# Patient Record
Sex: FEMALE | Race: WHITE | NOT HISPANIC OR LATINO | Employment: OTHER | URBAN - METROPOLITAN AREA
[De-identification: names, ages, dates, MRNs, and addresses within clinical notes are randomized per-mention and may not be internally consistent; named-entity substitution may affect disease eponyms.]

---

## 2017-01-16 ENCOUNTER — OFFICE VISIT (OUTPATIENT)
Dept: PRIMARY CARE CLINIC | Facility: CLINIC | Age: 72
End: 2017-01-16
Payer: MEDICARE

## 2017-01-16 VITALS
BODY MASS INDEX: 28.13 KG/M2 | SYSTOLIC BLOOD PRESSURE: 184 MMHG | OXYGEN SATURATION: 95 % | WEIGHT: 143.31 LBS | RESPIRATION RATE: 22 BRPM | DIASTOLIC BLOOD PRESSURE: 74 MMHG | HEIGHT: 60 IN | HEART RATE: 64 BPM | TEMPERATURE: 97 F

## 2017-01-16 DIAGNOSIS — R06.2 WHEEZING: ICD-10-CM

## 2017-01-16 DIAGNOSIS — J44.1 COPD EXACERBATION: Primary | ICD-10-CM

## 2017-01-16 PROCEDURE — 3078F DIAST BP <80 MM HG: CPT | Mod: S$GLB,,, | Performed by: NURSE PRACTITIONER

## 2017-01-16 PROCEDURE — 1126F AMNT PAIN NOTED NONE PRSNT: CPT | Mod: S$GLB,,, | Performed by: NURSE PRACTITIONER

## 2017-01-16 PROCEDURE — 94640 AIRWAY INHALATION TREATMENT: CPT | Mod: S$GLB,,, | Performed by: NURSE PRACTITIONER

## 2017-01-16 PROCEDURE — 1157F ADVNC CARE PLAN IN RCRD: CPT | Mod: S$GLB,,, | Performed by: NURSE PRACTITIONER

## 2017-01-16 PROCEDURE — 99999 PR PBB SHADOW E&M-EST. PATIENT-LVL IV: CPT | Mod: PBBFAC,,, | Performed by: NURSE PRACTITIONER

## 2017-01-16 PROCEDURE — 1160F RVW MEDS BY RX/DR IN RCRD: CPT | Mod: S$GLB,,, | Performed by: NURSE PRACTITIONER

## 2017-01-16 PROCEDURE — 99213 OFFICE O/P EST LOW 20 MIN: CPT | Mod: 25,S$GLB,, | Performed by: NURSE PRACTITIONER

## 2017-01-16 PROCEDURE — 3077F SYST BP >= 140 MM HG: CPT | Mod: S$GLB,,, | Performed by: NURSE PRACTITIONER

## 2017-01-16 PROCEDURE — 1159F MED LIST DOCD IN RCRD: CPT | Mod: S$GLB,,, | Performed by: NURSE PRACTITIONER

## 2017-01-16 RX ORDER — ALBUTEROL SULFATE 0.83 MG/ML
2.5 SOLUTION RESPIRATORY (INHALATION)
Status: COMPLETED | OUTPATIENT
Start: 2017-01-16 | End: 2017-01-16

## 2017-01-16 RX ORDER — ALBUTEROL SULFATE 90 UG/1
2 AEROSOL, METERED RESPIRATORY (INHALATION) EVERY 6 HOURS PRN
Qty: 18 G | Refills: 0 | Status: SHIPPED | OUTPATIENT
Start: 2017-01-16 | End: 2017-03-15 | Stop reason: SDUPTHER

## 2017-01-16 RX ORDER — DOXYCYCLINE 100 MG/1
100 CAPSULE ORAL 2 TIMES DAILY
Qty: 14 CAPSULE | Refills: 0 | Status: SHIPPED | OUTPATIENT
Start: 2017-01-16 | End: 2017-01-23

## 2017-01-16 RX ADMIN — ALBUTEROL SULFATE 2.5 MG: 0.83 SOLUTION RESPIRATORY (INHALATION) at 10:01

## 2017-01-16 NOTE — PATIENT INSTRUCTIONS
"Your symptoms today are consistent with lung infection and wheezing.  This is likely a viral illness that needs to run its course.    Doxycycline antibiotic prescribed for the infection.  Start it today.  If you get stomach upset from antibiotics, try taking probiotic or eating a yogurt with active culture to prevent stomach upset while on the antibiotic.    Comfort measures:  Increase fluids  Get plenty of rest  Vaporizer or frequent showers may be helpful.  Take tylenol of ibuprofen as needed for pain or fever  For cough and thick mucus secretions, you can take over the counter guaifenesin and dextromethorphan (mucinex DM), drink plenty of water while taking this to help loosen mucus.  The following is also prescribed for the cough albuterol inhaler.    If you have high blood pressure, avoid any over the counter medications with "D" or decongestant.  Medication for colds that "HBP" designation (like Coricidin HBP or similar).    Salt water gargles.  Saline nasal spray  Wash cloth with warm water placed over the sinus area can lessen discomfort and pressure.  Sleep with head of bed elevated to decrease drainage, cough and congestion.    If you are using an spacer with the inhaler:  Shake the inhaler.  Connect it to the spacer.  Put the spacer to your lips.  Spray the medication into the spacer.  Slowly inhale the medication from the spacer.  Hold to the count of 10, if you can.  Breathe out.  Rinse your mouth out with water and spit it in the sink.  Wait 1-2 minutes.  Repeat.    I recommend frequent handwashing to limit spread of infection to others     If you are not better in 3-5 days, if worse or you have concerns or questions, please do not hesitate to call.  You can reach us at [phone] Monday through Friday (except holidays) 12 nooon to 6y p.m.  Or you can follow up with your primary care provider/NP.    Thank you for using the [facility]!    ALETA Hand, CNP, FNP-BC  [facility]

## 2017-01-16 NOTE — MR AVS SNAPSHOT
[facility]  [address]  [place] [address]  Phone: [phone]                  Maryann Haines   2017 10:20 AM   Office Visit    Description:  Female : 1945   Provider:  Love Torres NP   Department:  [facility]           Reason for Visit     URI     LRI     Shortness of Breath           Diagnoses this Visit        Comments    COPD exacerbation    -  Primary with infection    Wheezing                To Do List           Future Appointments        Provider Department Dept Phone    2017 8:20 AM Hong Alcala MD [facility] [phone]      Goals (5 Years of Data)     None       These Medications        Disp Refills Start End    doxycycline (MONODOX) 100 MG capsule 14 capsule 0 2017    Take 1 capsule (100 mg total) by mouth 2 (two) times daily. - Oral    Pharmacy: [facility] [address] [place] [address] [id number] [place] [address] AT [facility] [address] Ph #: [phone]       albuterol 90 mcg/actuation inhaler 18 g 0 2017     Inhale 2 puffs into the lungs every 6 (six) hours as needed for Wheezing. - Inhalation    Pharmacy: [facility] [place] [address] AT [facility] [address] Ph #: [phone]         [facility] On Call     [facility] On [facility] Care [facility] -  Assistance  Registered nurses in the [facility] provide clinical advisement, health education, appointment booking, and other advisory services.  Call for this free service at [phone].             Medications           Message regarding Medications     Verify the changes and/or additions to your medication regime listed below are the same as discussed with your clinician today.  If any of these changes or additions are incorrect, please notify your healthcare provider.        START taking these NEW medications        Refills    doxycycline (MONODOX) 100 MG capsule 0    Sig: Take  1 capsule (100 mg total) by mouth 2 (two) times daily.    Class: Normal    Route: Oral    albuterol 90 mcg/actuation inhaler 0    Sig: Inhale 2 puffs into the lungs every 6 (six) hours as needed for Wheezing.    Class: Normal    Route: Inhalation      These medications were administered today        Dose Freq    albuterol nebulizer solution 2.5 mg 2.5 mg [facility] 1 time    Sig: Take 3 mLs (2.5 mg total) by nebulization one time.    Class: Normal    Route: Nebulization           Verify that the below list of medications is an accurate representation of the medications you are currently taking.  If none reported, the list may be blank. If incorrect, please contact your healthcare provider. Carry this list with you in case of emergency.           Current Medications     amlodipine (NORVASC) 5 MG tablet Take 1 tablet (5 mg total) by mouth once daily.    aspirin (ECOTRIN) 81 MG EC tablet Take 81 mg by mouth once daily.    atenolol (TENORMIN) 25 MG tablet Take 1 tablet (25 mg total) by mouth once daily.    clopidogrel (PLAVIX) 75 mg tablet Take 1 tablet (75 mg total) by mouth once daily.    hydrochlorothiazide (HYDRODIURIL) 25 MG tablet Take 1 tablet (25 mg total) by mouth once daily.    lisinopril (PRINIVIL,ZESTRIL) 20 MG tablet Take 1 tablet (20 mg total) by mouth once daily.    lorazepam (ATIVAN) 1 MG tablet Take 1 tablet (1 mg total) by mouth 2 (two) times daily as needed.    metformin (GLUCOPHAGE) 500 MG tablet Take 1 tablet (500 mg total) by mouth 2 (two) times daily with meals.    niacin (SLO-NIACIN) 500 mg TbSR Take 500 mg by mouth nightly.    nystatin (MYCOSTATIN) cream Apply topically 2 (two) times daily.    pravastatin (PRAVACHOL) 40 MG tablet Take 1 tablet (40 mg total) by mouth once daily.    albuterol 90 mcg/actuation inhaler Inhale 2 puffs into the lungs every 6 (six) hours as needed for Wheezing.    doxycycline (MONODOX) 100 MG capsule Take 1 capsule (100 mg total) by mouth 2 (two) times daily.            Clinical Reference Information           Vital Signs - Last Recorded  Most recent update: 1/16/2017  9:52 AM by Viv Das LPN    BP Pulse Temp Resp Ht Wt    (!) 184/74 (BP Location: Left arm, Patient Position: Sitting, BP Method: Manual) 64 97.4 °F (36.3 °C) (Oral) (!) 22 5' (1.524 m) 65 kg (143 lb 4.8 oz)    SpO2 BMI             95% 27.99 kg/m2         Blood Pressure          Most Recent Value    BP  (!)  184/74      Allergies as of 1/16/2017     No Known Allergies      Immunizations Administered on Date of Encounter - 1/16/2017     None      Administrations This Visit     albuterol nebulizer solution 2.5 mg     Admin Date Action Dose Route Administered By             01/16/2017 Given 2.5 mg Nebulization Viv Das LPN                      [employer] Sign-Up     Activating your [facility] account is as easy as 1-2-3!     1) Visit [email or web address], select Sign Up Now, enter this activation code and your date of birth, then select Next.  [email or web address]  Expires: 3/2/2017 10:43 AM      2) Create a username and password to use when you visit [facility] in the future and select a security question in case you lose your password and select Next.    3) Enter your e-mail address and click Sign Up!    Additional Information  If you have questions, please e-mail [email or web address] or call [phone] to talk to our [facility] staff. Remember, [facility] is NOT to be used for urgent needs. For medical emergencies, dial 911.         Instructions    Your symptoms today are consistent with lung infection and wheezing.  This is likely a viral illness that needs to run its course.    Doxycycline antibiotic prescribed for the infection.  Start it today.  If you get stomach upset from antibiotics, try taking probiotic or eating a yogurt with active culture to prevent stomach upset while on the antibiotic.    Comfort measures:  Increase fluids  Get plenty of rest  Vaporizer or frequent showers may be  "helpful.  Take tylenol of ibuprofen as needed for pain or fever  For cough and thick mucus secretions, you can take over the counter guaifenesin and dextromethorphan (mucinex DM), drink plenty of water while taking this to help loosen mucus.  The following is also prescribed for the cough albuterol inhaler.    If you have high blood pressure, avoid any over the counter medications with "D" or decongestant.  Medication for colds that "HBP" designation (like Coricidin HBP or similar).    Salt water gargles.  Saline nasal spray  Wash cloth with warm water placed over the sinus area can lessen discomfort and pressure.  Sleep with head of bed elevated to decrease drainage, cough and congestion.    If you are using an spacer with the inhaler:  Shake the inhaler.  Connect it to the spacer.  Put the spacer to your lips.  Spray the medication into the spacer.  Slowly inhale the medication from the spacer.  Hold to the count of 10, if you can.  Breathe out.  Rinse your mouth out with water and spit it in the sink.  Wait 1-2 minutes.  Repeat.    I recommend frequent handwashing to limit spread of infection to others     If you are not better in 3-5 days, if worse or you have concerns or questions, please do not hesitate to call.  You can reach us at [phone] Monday through Friday (except holidays) 12 nooon to 6y p.m.  Or you can follow up with your primary care provider/NP.    Thank you for using the [facility]!    ALETA Hand, CNP, FNP-BC  [facility]           "

## 2017-01-16 NOTE — Clinical Note
Hong Alcala MD,  I saw your patient today in the [facility].  If you have any questions, please do not hesitate to contact me or Dr. Virk.  Thank you!  SHARATH Hand

## 2017-01-16 NOTE — PROGRESS NOTES
"Maryann Haines is a 71 y.o. female patient of Hong Alcala MD who presents to the clinic today for   Chief Complaint   Patient presents with    URI     nasal congestion, ear pressure, dizzy    LRI     cough, chest congestion    Shortness of Breath   .    HPI    Pt, who is not known to me, reports a new problem to me, as follows:  Head and nasal congestion, ear pressure, no ST, + RN, + wheezing, + feverish.  Grandson had bronchitis and strep and then she became ill.    These symptoms began 4 days ago and status is maybe "a smidge" better.  But then gets worse again..     Symptoms are + acute exac of chronic lung problems (smoker).    Pt denies the following symptoms:  ST, stomach ache, rash    Aggravating factors include nothing .    Relieving factors include nothing .    OTC Medications tried are sinus medicine (helped her ears).    Prescription medications taken for symptoms are nothing.    Pertinent medical history:  Tobacco abuse, usually doesn't get ill..    Smoking status:  Continues to smoke, decreasing amount    ROS    Constitutional:   Feels at times like she has  fever, + fatigue, no change in appetite.      Head:    + headache    EENT:  Ears:    No pain  Eyes:    No sxs  Nose:    No sinus pain, + congestion, + runny nose.  Throat:  no ST pain.               Heart:  No palpitations, chest pain.    Lungs:  + difficulty breathing, + coughing, + sputum production, + wheezing.              Symptoms are community acquired.    GI/:  No sxs    MS:  No new bone, joint or muscle problems.    Skin:  No rashes, itching.    ALLERGIES AND MEDICATIONS: updated and reviewed.  Review of patient's allergies indicates:  No Known Allergies  Current Outpatient Prescriptions   Medication Sig Dispense Refill    amlodipine (NORVASC) 5 MG tablet Take 1 tablet (5 mg total) by mouth once daily. 90 tablet 0    aspirin (ECOTRIN) 81 MG EC tablet Take 81 mg by mouth once daily.      atenolol (TENORMIN) 25 MG tablet Take 1 " tablet (25 mg total) by mouth once daily. 90 tablet 0    clopidogrel (PLAVIX) 75 mg tablet Take 1 tablet (75 mg total) by mouth once daily. 90 tablet 0    hydrochlorothiazide (HYDRODIURIL) 25 MG tablet Take 1 tablet (25 mg total) by mouth once daily. 90 tablet 3    lisinopril (PRINIVIL,ZESTRIL) 20 MG tablet Take 1 tablet (20 mg total) by mouth once daily. 90 tablet 0    lorazepam (ATIVAN) 1 MG tablet Take 1 tablet (1 mg total) by mouth 2 (two) times daily as needed. 60 tablet 2    metformin (GLUCOPHAGE) 500 MG tablet Take 1 tablet (500 mg total) by mouth 2 (two) times daily with meals. 180 tablet 0    niacin (SLO-NIACIN) 500 mg TbSR Take 500 mg by mouth nightly. 90 tablet 2    nystatin (MYCOSTATIN) cream Apply topically 2 (two) times daily. 90 g 1    pravastatin (PRAVACHOL) 40 MG tablet Take 1 tablet (40 mg total) by mouth once daily. 90 tablet 0     No current facility-administered medications for this visit.        PHYSICAL EXAM    Alert, coop 71 y.o. female patient in no acute distress.    Vitals:    01/16/17 0946 01/16/17 0950   BP: (!) 196/91 (!) 184/74   BP Location: Left arm Left arm   Patient Position: Sitting Sitting   BP Method: Automatic Manual   Pulse: 64    Resp: (!) 22    Temp: 97.4 °F (36.3 °C)    TempSrc: Oral    SpO2: 95%    Weight: 65 kg (143 lb 4.8 oz)    Height: 5' (1.524 m)      VS reviewed.  VS elevated BP.  CC, nursing note, medications, PMH, PSH, FH and Soc Hx all reviewed today.    Head:  Normocephalic, atraumatic.    EENT:  EACs patent.  TMs no erythema, dull LR, no effusions, no TM perforation.               Eye lids normal, no discharge present.       No Sinus tenderness to palp.               Nares--mild edema,     Pharynx not injected.                Tonsils not erythematous , not enlarged, no exudate present.    No anterior, no posterior cervical lymph nodes palpable.    No submental, submandibular, preauricular or supraclavicular lymph nodes palp.             Resp:   "Respirations even, mildy labored with walking   Lungs bilat. Insp/exp wheezing, decreased movement at bases bilat, no crackles heard initially.              Post neb treatment:  "I feel so much better."  Lungs with good air movement but continued wheezing, no crackles.  Heart:  RRR, no MRG.    MS:  Ambulates indep.             NEURO:  Alert and oriented x 4.  Responds appropriately during interaction.    Skin:  Warm, dry, color good.    Psych:  Responds appropriately throughout the visit.               Relaxed.  Well-groomed.    COPD exacerbation  Comments:  with infection  Orders:  -     doxycycline (MONODOX) 100 MG capsule; Take 1 capsule (100 mg total) by mouth 2 (two) times daily.  Dispense: 14 capsule; Refill: 0    Wheezing  -     albuterol nebulizer solution 2.5 mg; Take 3 mLs (2.5 mg total) by nebulization one time.  -     albuterol 90 mcg/actuation inhaler; Inhale 2 puffs into the lungs every 6 (six) hours as needed for Wheezing.  Dispense: 18 g; Refill: 0      Pt today presents with coughing, wheezing, feeling very bad, yellow sputum.  40+ years of smoking so likely has COPD, although no dx on the chart..    This is a new problem to me.  No work up is planned.        Pt advised to perform comfort measures recommended on patient instruction sheet .  I went over in detail how to use an MDI with a spacer.  Written instructions also given.    If not better in 3-5 days, the patient is advised to call us.  If worse or concerns, the patient is advised to call us.  Explained exam findings, diagnosis and treatment plan to patient.  Questions answered and patient states understanding.    "

## 2017-01-19 NOTE — TELEPHONE ENCOUNTER
----- Message from Татьяна Holloway sent at 1/19/2017  9:33 AM CST -----  Contact: richy   Please previous message on refill   .  [facility] [address] [address] AT [facility] [address]  [address] [place] [address]  Phone: [phone] Fax: [phone]    Call back

## 2017-02-10 NOTE — TELEPHONE ENCOUNTER
----- Message from Namita Molina sent at 2/10/2017  8:01 AM CST -----  Contact: self  Patient needs a refill on Pravastatin called into [facility]'s pharmacy at [phone].  Please call patient at [phone] if you have any questions. Thanks!     [facility] [address] [place] [address] [id number] [place] [address] AT [facility] [address]  [address] [place] [address]  Phone: [phone] Fax: [phone]

## 2017-02-13 RX ORDER — PRAVASTATIN SODIUM 40 MG/1
40 TABLET ORAL DAILY
Qty: 90 TABLET | Refills: 0 | Status: SHIPPED | OUTPATIENT
Start: 2017-02-13 | End: 2017-05-17 | Stop reason: SDUPTHER

## 2017-02-22 ENCOUNTER — TELEPHONE (OUTPATIENT)
Dept: FAMILY MEDICINE | Facility: CLINIC | Age: 72
End: 2017-02-22

## 2017-03-02 NOTE — TELEPHONE ENCOUNTER
----- Message from Татьяна Holloway sent at 3/2/2017  9:05 AM CST -----  Contact: pt   atenolol (TENORMIN) 25 MG tablet  .  [facility] [address] [place] [address] AT [facility] OF [facility] [address]  [address] [place] [address]  Phone: [phone] Fax: [phone]    Call back

## 2017-03-03 RX ORDER — ATENOLOL 25 MG/1
25 TABLET ORAL DAILY
Qty: 90 TABLET | Refills: 0 | Status: SHIPPED | OUTPATIENT
Start: 2017-03-03 | End: 2017-06-06 | Stop reason: SDUPTHER

## 2017-03-15 ENCOUNTER — OFFICE VISIT (OUTPATIENT)
Dept: FAMILY MEDICINE | Facility: CLINIC | Age: 72
End: 2017-03-15
Payer: MEDICARE

## 2017-03-15 ENCOUNTER — LAB VISIT (OUTPATIENT)
Dept: LAB | Facility: HOSPITAL | Age: 72
End: 2017-03-15
Attending: FAMILY MEDICINE
Payer: MEDICARE

## 2017-03-15 VITALS
WEIGHT: 150.25 LBS | BODY MASS INDEX: 29.5 KG/M2 | DIASTOLIC BLOOD PRESSURE: 82 MMHG | HEART RATE: 61 BPM | HEIGHT: 60 IN | SYSTOLIC BLOOD PRESSURE: 142 MMHG

## 2017-03-15 DIAGNOSIS — Z72.0 NICOTINE ABUSE: ICD-10-CM

## 2017-03-15 DIAGNOSIS — R06.2 WHEEZING: ICD-10-CM

## 2017-03-15 DIAGNOSIS — E11.9 TYPE 2 DIABETES MELLITUS WITHOUT COMPLICATION, WITHOUT LONG-TERM CURRENT USE OF INSULIN: ICD-10-CM

## 2017-03-15 DIAGNOSIS — E11.9 TYPE 2 DIABETES MELLITUS WITHOUT COMPLICATION, WITHOUT LONG-TERM CURRENT USE OF INSULIN: Primary | ICD-10-CM

## 2017-03-15 DIAGNOSIS — I10 ESSENTIAL HYPERTENSION: ICD-10-CM

## 2017-03-15 PROCEDURE — 1160F RVW MEDS BY RX/DR IN RCRD: CPT | Mod: S$GLB,,, | Performed by: FAMILY MEDICINE

## 2017-03-15 PROCEDURE — 99214 OFFICE O/P EST MOD 30 MIN: CPT | Mod: S$GLB,,, | Performed by: FAMILY MEDICINE

## 2017-03-15 PROCEDURE — 99499 UNLISTED E&M SERVICE: CPT | Mod: S$GLB,,, | Performed by: FAMILY MEDICINE

## 2017-03-15 PROCEDURE — 2022F DILAT RTA XM EVC RTNOPTHY: CPT | Mod: S$GLB,,, | Performed by: FAMILY MEDICINE

## 2017-03-15 PROCEDURE — 3079F DIAST BP 80-89 MM HG: CPT | Mod: S$GLB,,, | Performed by: FAMILY MEDICINE

## 2017-03-15 PROCEDURE — 1159F MED LIST DOCD IN RCRD: CPT | Mod: S$GLB,,, | Performed by: FAMILY MEDICINE

## 2017-03-15 PROCEDURE — 99999 PR PBB SHADOW E&M-EST. PATIENT-LVL III: CPT | Mod: PBBFAC,,, | Performed by: FAMILY MEDICINE

## 2017-03-15 PROCEDURE — 36415 COLL VENOUS BLD VENIPUNCTURE: CPT | Mod: PO

## 2017-03-15 PROCEDURE — 3044F HG A1C LEVEL LT 7.0%: CPT | Mod: S$GLB,,, | Performed by: FAMILY MEDICINE

## 2017-03-15 PROCEDURE — 3077F SYST BP >= 140 MM HG: CPT | Mod: S$GLB,,, | Performed by: FAMILY MEDICINE

## 2017-03-15 PROCEDURE — 1157F ADVNC CARE PLAN IN RCRD: CPT | Mod: S$GLB,,, | Performed by: FAMILY MEDICINE

## 2017-03-15 PROCEDURE — 83036 HEMOGLOBIN GLYCOSYLATED A1C: CPT

## 2017-03-15 PROCEDURE — 1126F AMNT PAIN NOTED NONE PRSNT: CPT | Mod: S$GLB,,, | Performed by: FAMILY MEDICINE

## 2017-03-15 PROCEDURE — 4010F ACE/ARB THERAPY RXD/TAKEN: CPT | Mod: S$GLB,,, | Performed by: FAMILY MEDICINE

## 2017-03-15 RX ORDER — ALBUTEROL SULFATE 90 UG/1
2 AEROSOL, METERED RESPIRATORY (INHALATION) EVERY 6 HOURS PRN
Qty: 18 G | Refills: 0 | Status: SHIPPED | OUTPATIENT
Start: 2017-03-15 | End: 2018-05-15 | Stop reason: SDUPTHER

## 2017-03-15 NOTE — MR AVS SNAPSHOT
[facility]  [address]  [facility] [address]  Phone: [phone]  Fax: [phone]                  Maryann Haines   3/15/2017 7:00 AM   Office Visit    Description:  Female : 1945   Provider:  Hong Alcala MD   Department:  [facility]           Reason for Visit     Follow-up           Diagnoses this Visit        Comments    Type 2 diabetes mellitus without complication, without long-term current use of insulin    -  Primary     Wheezing         Essential hypertension         Controlled type 2 diabetes mellitus without complication, without long-term current use of insulin                To Do List           Future Appointments        Provider Department Dept Phone    9/15/2017 7:00 AM Hong Alcala MD [facility] [phone]      Goals (5 Years of Data)     None      Follow-Up and Disposition     Return in about 4 months (around 7/15/2017).       These Medications        Disp Refills Start End    albuterol 90 mcg/actuation inhaler 18 g 0 3/15/2017     Inhale 2 puffs into the lungs every 6 (six) hours as needed for Wheezing. - Inhalation    [facility] [address] [place] [address] [address] AT [facility] OF [address] Ph #: [phone]         [facility] On Call     [facility] On Call Nurse Care Line -  Assistance  Registered nurses in the [facility] provide clinical advisement, health education, appointment booking, and other advisory services.  Call for this free service at [phone].             Medications           Message regarding Medications     Verify the changes and/or additions to your medication regime listed below are the same as discussed with your clinician today.  If any of these changes or additions are incorrect, please notify your healthcare provider.             Verify that the below list of medications is an accurate representation of the medications you are  currently taking.  If none reported, the list may be blank. If incorrect, please contact your healthcare provider. Carry this list with you in case of emergency.           Current Medications     albuterol 90 mcg/actuation inhaler Inhale 2 puffs into the lungs every 6 (six) hours as needed for Wheezing.    amlodipine (NORVASC) 5 MG tablet Take 1 tablet (5 mg total) by mouth once daily.    aspirin (ECOTRIN) 81 MG EC tablet Take 81 mg by mouth once daily.    atenolol (TENORMIN) 25 MG tablet Take 1 tablet (25 mg total) by mouth once daily.    clopidogrel (PLAVIX) 75 mg tablet Take 1 tablet (75 mg total) by mouth once daily.    hydrochlorothiazide (HYDRODIURIL) 25 MG tablet Take 1 tablet (25 mg total) by mouth once daily.    lisinopril (PRINIVIL,ZESTRIL) 20 MG tablet Take 1 tablet (20 mg total) by mouth once daily.    lorazepam (ATIVAN) 1 MG tablet Take 1 tablet (1 mg total) by mouth 2 (two) times daily as needed.    metformin (GLUCOPHAGE) 500 MG tablet Take 1 tablet (500 mg total) by mouth 2 (two) times daily with meals.    niacin (SLO-NIACIN) 500 mg TbSR Take 500 mg by mouth nightly.    nystatin (MYCOSTATIN) cream Apply topically 2 (two) times daily.    pravastatin (PRAVACHOL) 40 MG tablet Take 1 tablet (40 mg total) by mouth once daily.           Clinical Reference Information           Your Vitals Were     BP Pulse Height Weight BMI    142/82 61 5' (1.524 m) 68.1 kg (150 lb 3.5 oz) 29.34 kg/m2      Blood Pressure          Most Recent Value    BP  (!)  142/82      Allergies as of 3/15/2017     No Known Allergies      Immunizations Administered on Date of Encounter - 3/15/2017     None      Orders Placed During Today's Visit     Future Labs/Procedures Expected by Expires    Hemoglobin A1c  6/13/2017 3/15/2018      [facility] Sign-Up     Activating your [facility] account is as easy as 1-2-3!     1) Visit [email or web address], select Sign Up Now, enter this activation code and your date of birth, then select  Next.  [email or web address]  Expires: 4/29/2017  7:41 AM      2) Create a username and password to use when you visit [facility] in the future and select a security question in case you lose your password and select Next.    3) Enter your e-mail address and click Sign Up!    Additional Information  If you have questions, please e-mail [email or web address] or call [phone] to talk to our [email or web address] staff. Remember, [facility] is NOT to be used for urgent needs. For medical emergencies, dial 911.         Language Assistance Services     ATTENTION: Language assistance services are available, free of charge. Please call [phone].      ATENCIÓN: Si tea greco, tiene a simpson disposición servicios gratuitos de asistencia lingüística. Llame al [phone].     [facility] Ý: N?u b?n nói Ti?ng Vi?t, có các d?ch v? h? tr? ngôn ng? mi?n phí dành cho b?n. G?i s? [phone].         [facility] complies with applicable Federal civil rights laws and does not discriminate on the basis of race, color, national origin, age, disability, or sex.

## 2017-03-16 LAB
ESTIMATED AVG GLUCOSE: 120 MG/DL
HBA1C MFR BLD HPLC: 5.8 %

## 2017-03-23 NOTE — PROGRESS NOTES
A pleasant female here today.  She as usual does not want to do much testing.    She has type 2 diabetes and chronic wheezing from COPD.  She continues to smoke.    Hypertension.    MEDICATIONS:  Reviewed.    No chest pain, no acute shortness of breath, nausea or vomiting.  No polyuria,   polydipsia or polyphagia.    Vital signs normal.  Chest clear.  No edema of the extremities.  No wheezes or   rhonchi today.  Abdomen is soft and nontender.  No peripheral edema.  No skin   rash.    Type 2 diabetes, hypertension, nicotine abuse, and wheezing.    The patient does not want to stop smoking.  She does not want to do anything   new.  No new testing.  She did agree to hemoglobin A1c.  We did discuss goals   for diabetes and health maintenance, none of which she was interested in.      ARON/JUSTUS  dd: 03/23/2017 18:49:53 (CDT)  td: 03/24/2017 17:43:33 (CDT)  Doc ID   #[id number]  Job ID #[id number]    CC:

## 2017-03-30 RX ORDER — LISINOPRIL 20 MG/1
20 TABLET ORAL DAILY
Qty: 90 TABLET | Refills: 0 | Status: SHIPPED | OUTPATIENT
Start: 2017-03-30 | End: 2017-06-30 | Stop reason: SDUPTHER

## 2017-03-30 NOTE — TELEPHONE ENCOUNTER
----- Message from Ana Gonzalez sent at 3/29/2017  3:23 PM CDT -----  Contact: self  Needs refill on Lisinopril.  Pharmacy told her to contact doctor.   Please call back at       [employer] [address] - [facility] [address] [place] [address] AT [facility] [address]  [address] [place] [address]  Phone: [phone] Fax: [phone]

## 2017-04-19 RX ORDER — CLOPIDOGREL BISULFATE 75 MG/1
75 TABLET ORAL DAILY
Qty: 90 TABLET | Refills: 0 | Status: SHIPPED | OUTPATIENT
Start: 2017-04-19 | End: 2017-06-30 | Stop reason: SDUPTHER

## 2017-04-19 RX ORDER — AMLODIPINE BESYLATE 5 MG/1
5 TABLET ORAL DAILY
Qty: 90 TABLET | Refills: 0 | Status: SHIPPED | OUTPATIENT
Start: 2017-04-19 | End: 2017-06-30 | Stop reason: SDUPTHER

## 2017-04-19 NOTE — TELEPHONE ENCOUNTER
----- Message from Xenia Mccurdy sent at 4/19/2017 10:11 AM CDT -----  Contact: Patient  Maryann, patient [phone], Calling for refills on  Rx clopidogrel 75 mg and amlodipine besylate 5 mg.     [facility] [address]   [facility] [address]  [address] [address]  Phone: [phone] Fax: [phone]    Please advise. Thanks.

## 2017-05-22 RX ORDER — PRAVASTATIN SODIUM 40 MG/1
TABLET ORAL
Qty: 90 TABLET | Refills: 0 | Status: SHIPPED | OUTPATIENT
Start: 2017-05-22 | End: 2017-10-10 | Stop reason: SDUPTHER

## 2017-05-22 RX ORDER — PRAVASTATIN SODIUM 40 MG/1
40 TABLET ORAL DAILY
Qty: 90 TABLET | Refills: 0 | Status: SHIPPED | OUTPATIENT
Start: 2017-05-22 | End: 2017-08-16 | Stop reason: SDUPTHER

## 2017-05-22 NOTE — TELEPHONE ENCOUNTER
----- Message from Anusha Nolasco sent at 5/22/2017  9:34 AM CDT -----  Contact: [phone]  Patient requesting a refill on pravastatin 40mg.    Patient will be using   [facility] [address] [facility] [address] [facility] 59 AT [facility] [address]  [address] [address] [address]  Phone: [phone] Fax: [phone]    Please call patient at [phone]. Thanks!

## 2017-06-06 RX ORDER — ATENOLOL 25 MG/1
25 TABLET ORAL DAILY
Qty: 90 TABLET | Refills: 0 | Status: SHIPPED | OUTPATIENT
Start: 2017-06-06 | End: 2017-07-31 | Stop reason: SDUPTHER

## 2017-06-30 RX ORDER — LISINOPRIL 20 MG/1
20 TABLET ORAL DAILY
Qty: 90 TABLET | Refills: 0 | Status: SHIPPED | OUTPATIENT
Start: 2017-06-30 | End: 2017-09-25 | Stop reason: SDUPTHER

## 2017-06-30 RX ORDER — AMLODIPINE BESYLATE 5 MG/1
5 TABLET ORAL DAILY
Qty: 90 TABLET | Refills: 0 | Status: SHIPPED | OUTPATIENT
Start: 2017-06-30 | End: 2017-10-10 | Stop reason: SDUPTHER

## 2017-06-30 RX ORDER — CLOPIDOGREL BISULFATE 75 MG/1
75 TABLET ORAL DAILY
Qty: 90 TABLET | Refills: 0 | Status: SHIPPED | OUTPATIENT
Start: 2017-06-30 | End: 2017-10-10 | Stop reason: SDUPTHER

## 2017-06-30 RX ORDER — LORAZEPAM 1 MG/1
1 TABLET ORAL 2 TIMES DAILY PRN
Qty: 60 TABLET | Refills: 2 | Status: SHIPPED | OUTPATIENT
Start: 2017-06-30 | End: 2017-10-27 | Stop reason: SDUPTHER

## 2017-06-30 NOTE — TELEPHONE ENCOUNTER
We can provide one refill, but we are currently at our capacity for patients with chronic issues requiring controlled substances, such as pain or ADD.  For this reason they will need to follow up with an outside provider to continue their controlled medication regimen.  We can continue to take care of all their other issues.

## 2017-07-28 ENCOUNTER — TELEPHONE (OUTPATIENT)
Dept: FAMILY MEDICINE | Facility: CLINIC | Age: 72
End: 2017-07-28

## 2017-07-28 NOTE — TELEPHONE ENCOUNTER
----- Message from Ana Gonzalez sent at 7/27/2017  1:01 PM CDT -----  Contact: self  Former patient of Dr Alcala, was told by Bertha that her meds would be refilled Lorazepam 1 mg, Metformin 500 mg, Atenolol 25 mg.  Please call back at [phone] (home)     [facility] [address] [place] [address] [id number] [place] [address] AT [facility] [address]  [address] [place] [address]  Phone: [phone] Fax: [phone]

## 2017-07-28 NOTE — TELEPHONE ENCOUNTER
Message was sent to Dr. Long staff but pt does not have appt to est care with him. Please advise. Thanks.

## 2017-07-31 RX ORDER — METFORMIN HYDROCHLORIDE 500 MG/1
500 TABLET ORAL 2 TIMES DAILY WITH MEALS
Qty: 180 TABLET | Refills: 0 | Status: SHIPPED | OUTPATIENT
Start: 2017-07-31 | End: 2017-10-26 | Stop reason: SDUPTHER

## 2017-07-31 RX ORDER — LORAZEPAM 1 MG/1
1 TABLET ORAL 2 TIMES DAILY PRN
Qty: 60 TABLET | Refills: 2 | OUTPATIENT
Start: 2017-07-31

## 2017-07-31 RX ORDER — ATENOLOL 25 MG/1
25 TABLET ORAL DAILY
Qty: 90 TABLET | Refills: 0 | Status: SHIPPED | OUTPATIENT
Start: 2017-07-31 | End: 2017-09-07 | Stop reason: SDUPTHER

## 2017-07-31 NOTE — TELEPHONE ENCOUNTER
----- Message from Amilcar Colbert sent at 7/31/2017  7:01 AM CDT -----  Contact: self   [phone]  Patient called asking for the status for rx refills metfromin 500 mg, atenolol,rx lorazapam 1mg with Diamond [id number] Thanks!

## 2017-08-16 NOTE — TELEPHONE ENCOUNTER
----- Message from Amilcar Colbert sent at 8/16/2017  8:35 AM CDT -----  Contact: self-  [phone]  Patient called asking for rx refill pravastatin  400 mg with Diamond [phone]. Thanks!

## 2017-08-18 ENCOUNTER — TELEPHONE (OUTPATIENT)
Dept: FAMILY MEDICINE | Facility: CLINIC | Age: 72
End: 2017-08-18

## 2017-08-18 RX ORDER — PRAVASTATIN SODIUM 40 MG/1
40 TABLET ORAL DAILY
Qty: 90 TABLET | Refills: 0 | Status: SHIPPED | OUTPATIENT
Start: 2017-08-18 | End: 2017-11-20 | Stop reason: SDUPTHER

## 2017-08-18 NOTE — TELEPHONE ENCOUNTER
----- Message from Radha Mack sent at 8/18/2017  8:27 AM CDT -----  Contact: self [phone]  She is requesting a refill of pravastatin 400 mg be sent to:   [facility] [address] [place] [address] [facility] 59 AT [facility] [address]  [address] [address] [address]  Phone: [phone] Fax: [phone]    Thank you!

## 2017-09-05 RX ORDER — ATENOLOL 25 MG/1
25 TABLET ORAL DAILY
Qty: 90 TABLET | Refills: 0 | Status: CANCELLED | OUTPATIENT
Start: 2017-09-05

## 2017-09-05 NOTE — TELEPHONE ENCOUNTER
----- Message from Jennifer White sent at 9/5/2017  7:50 AM CDT -----  Contact: Maryann  Patient is requesting refill Rx Atenolol to be sent to     [facility] [address] - [facility] [address] [facility] 59 AT [facility] [address]  [address] [address] [address]  Phone: [phone] Fax: [phone]    Out of medication. Thanks!

## 2017-09-07 ENCOUNTER — TELEPHONE (OUTPATIENT)
Dept: FAMILY MEDICINE | Facility: CLINIC | Age: 72
End: 2017-09-07

## 2017-09-07 RX ORDER — ATENOLOL 25 MG/1
25 TABLET ORAL DAILY
Qty: 90 TABLET | Refills: 0 | Status: SHIPPED | OUTPATIENT
Start: 2017-09-07 | End: 2018-02-05 | Stop reason: SDUPTHER

## 2017-09-07 NOTE — TELEPHONE ENCOUNTER
----- Message from Namita Molina sent at 9/7/2017 10:53 AM CDT -----  Contact: self  Patient, former Dr Garduno patient and states this is her second request. If she does not get her prescription by today she is going higher up. adan call in [facility]. Please call patient at [phone]. Thanks!  [facility] [address] [place] [address] [id number] [place] [address] AT [facility] [address]  [address] [place] [address]  Phone: [phone] Fax: [phone]

## 2017-09-26 RX ORDER — LISINOPRIL 20 MG/1
TABLET ORAL
Qty: 90 TABLET | Refills: 1 | Status: SHIPPED | OUTPATIENT
Start: 2017-09-26 | End: 2017-11-15 | Stop reason: SDUPTHER

## 2017-10-10 DIAGNOSIS — I63.9 CEREBROVASCULAR ACCIDENT (CVA), UNSPECIFIED MECHANISM: ICD-10-CM

## 2017-10-10 DIAGNOSIS — E11.9 CONTROLLED TYPE 2 DIABETES MELLITUS WITHOUT COMPLICATION, WITHOUT LONG-TERM CURRENT USE OF INSULIN: ICD-10-CM

## 2017-10-10 DIAGNOSIS — I10 ESSENTIAL HYPERTENSION: ICD-10-CM

## 2017-10-10 DIAGNOSIS — E78.5 HYPERLIPIDEMIA, UNSPECIFIED HYPERLIPIDEMIA TYPE: ICD-10-CM

## 2017-10-10 DIAGNOSIS — I10 HYPERTENSION, UNSPECIFIED TYPE: Primary | ICD-10-CM

## 2017-10-10 DIAGNOSIS — E78.5 DYSLIPIDEMIA: ICD-10-CM

## 2017-10-10 NOTE — TELEPHONE ENCOUNTER
----- Message from Jennifer Viramontes sent at 10/10/2017  7:55 AM CDT -----  Contact: self  Patient [phone] is calling in for refills/Clopidogrel 75mg - takes one daily/Slo Niacin 500mg - takes one daily/Amlodipine Besylate 5mg - takes one daily/Atenolol 25mg - takes one daily (pharmacy is saying not available until the end of this month)    [facility] [address] [place] [address] [facility] 59 AT [facility] [address]  [address] [address] [address]  Phone: [phone] Fax: [phone]

## 2017-10-10 NOTE — PROGRESS NOTES
Refill Authorization Note     is requesting a refill authorization.    Brief assessment and rational for refill: APPROVE: prr  Name of medication: niacin 500 mg SR / clopidogrel 75 mg / amlodipine 5 mg   How patient will take medication: t1t po qhs / t1t po daily / t1t po daily   Amount/Quantity of medication ordered: 90d   Medication reconciliation completed: No        Refills Authorized: Yes  If authorized number of refills: 0     Medication-related problems identified:   Therapeutic duplication  Medication education needs  Adverse drug effects  Medication Therapy Plan: Pt appears to be difficult.  Hx of CVA (plavix) but continues to smoke which exacerbates HTN. Patient needs updated labs but refuses labs per last notes.  Will approve 3 mo.  Niacin not documented in any note.  Will approve 3 mo  Comments:   BP Readings from Last 3 Encounters:   03/15/17 (!) 142/82   01/16/17 (!) 184/74   08/31/16 132/82      Lab Results   Component Value Date    CHOL 155 08/31/2016    CHOL 197 02/03/2016    CHOL 163 06/12/2015     Lab Results   Component Value Date    HDL 47 08/31/2016    HDL 46 02/03/2016    HDL 40 06/12/2015     Lab Results   Component Value Date    LDLCALC 79.0 08/31/2016    LDLCALC 111.8 02/03/2016    LDLCALC 95.6 06/12/2015     Lab Results   Component Value Date    TRIG 145 08/31/2016    TRIG 196 (H) 02/03/2016    TRIG 137 06/12/2015     Lab Results   Component Value Date    CHOLHDL 30.3 08/31/2016    CHOLHDL 23.4 02/03/2016    CHOLHDL 24.5 06/12/2015

## 2017-10-11 NOTE — TELEPHONE ENCOUNTER
----- Message from Monica Islas sent at 10/11/2017 11:57 AM CDT -----   Please call   Pt  Back  About    Refill   meds ,  Pt    Stated    A message  Was  Sent  Yesterday ,  And  Pharmacy   Does not  Have  Them //slo nicin 500  Mg /clopidogrel 75  Mg /amloditine besy lape  5 mg  // please  Call   [facility] [address] [place] [address] [id number] [place] [address] AT [facility] [address] [facility] [facility]  [address] [place] [address]  Phone: [phone] Fax: [phone]

## 2017-10-12 RX ORDER — LANOLIN ALCOHOL/MO/W.PET/CERES
500 CREAM (GRAM) TOPICAL NIGHTLY
Qty: 90 TABLET | Refills: 0 | Status: SHIPPED | OUTPATIENT
Start: 2017-10-12 | End: 2018-02-05 | Stop reason: SDUPTHER

## 2017-10-12 RX ORDER — CLOPIDOGREL BISULFATE 75 MG/1
TABLET ORAL
Qty: 90 TABLET | Refills: 0 | Status: SHIPPED | OUTPATIENT
Start: 2017-10-12

## 2017-10-12 RX ORDER — CLOPIDOGREL BISULFATE 75 MG/1
75 TABLET ORAL DAILY
Qty: 90 TABLET | Refills: 0 | Status: SHIPPED | OUTPATIENT
Start: 2017-10-12 | End: 2017-10-12 | Stop reason: SDUPTHER

## 2017-10-12 RX ORDER — AMLODIPINE BESYLATE 5 MG/1
5 TABLET ORAL DAILY
Qty: 90 TABLET | Refills: 0 | Status: SHIPPED | OUTPATIENT
Start: 2017-10-12 | End: 2018-01-10 | Stop reason: SDUPTHER

## 2017-10-12 NOTE — TELEPHONE ENCOUNTER
Labs right before next appointment.   Please schedule.  Also advise pt my max Ativan is #30 per month.

## 2017-10-26 NOTE — TELEPHONE ENCOUNTER
----- Message from Eunice Vila sent at 10/26/2017 12:26 PM CDT -----  Refill on Rx Metformin 500 mg, please send into [facility]/[phone].  Any questions call patient at [phone].

## 2017-10-27 RX ORDER — METFORMIN HYDROCHLORIDE 500 MG/1
500 TABLET ORAL 2 TIMES DAILY WITH MEALS
Qty: 180 TABLET | Refills: 0 | Status: SHIPPED | OUTPATIENT
Start: 2017-10-27 | End: 2018-02-05 | Stop reason: SDUPTHER

## 2017-10-29 RX ORDER — LORAZEPAM 1 MG/1
TABLET ORAL
Qty: 60 TABLET | Refills: 0 | Status: SHIPPED | OUTPATIENT
Start: 2017-10-29 | End: 2017-12-21 | Stop reason: SDUPTHER

## 2017-11-01 ENCOUNTER — PATIENT OUTREACH (OUTPATIENT)
Dept: ADMINISTRATIVE | Facility: HOSPITAL | Age: 72
End: 2017-11-01

## 2017-11-01 NOTE — PROGRESS NOTES
Health Maintenance Due   Topic Date Due    DEXA SCAN  07/24/1983    Colonoscopy  07/24/1993    Pneumococcal (65+) (1 of 2 - PCV13) 07/24/2008    Eye Exam  06/14/2015    Foot Exam  02/03/2017    Mammogram  08/31/2017    Lipid Panel  08/31/2017    Hemoglobin A1c  09/15/2017     Pre-visit outreach via mail

## 2017-11-01 NOTE — LETTER
November 1, 2017    Maryann Haines  [address]  [facility] [address]             [facility]  [address]  [facility] [address]  Phone: [phone] Dear Ms. Haines:    [facility] is committed to your overall health.  To help you get the most out of each of your visits, we will review your information to make sure you are up to date on all of your recommended tests and/or procedures.      Dr. Mauro       has found that you may be due for:    Osteoporosis screening (DEXA SCAN)  Colonoscopy (Colorectal screening)  Pneumonia immunization  Annual Dilated Eye Exam  Diabetic Foot Exam  Mammogram    REMINDER:  lab appointment 11/8/17    If you have had any of the above done at another facility, please bring the records or information with you so that your record at [facility] will be complete.     If you are currently taking medication, please bring it with you to your appointment for review.    If you have any questions or concerns, please don't hesitate to call.    Sincerely,    Mildred Quiñonez  Clinical Care Coordinator  [facility] [address] [facility] [address]  [place] [address]  Phone: [phone]   Fax: [phone]

## 2017-11-08 ENCOUNTER — TELEPHONE (OUTPATIENT)
Dept: ADMINISTRATIVE | Facility: CLINIC | Age: 72
End: 2017-11-08

## 2017-11-08 NOTE — TELEPHONE ENCOUNTER
Has a lab appt but is vomiting. Advised that she can still go tomorrow morning without a change in appt. States will call PCP office when open

## 2017-11-09 ENCOUNTER — LAB VISIT (OUTPATIENT)
Dept: LAB | Facility: HOSPITAL | Age: 72
End: 2017-11-09
Attending: INTERNAL MEDICINE
Payer: MEDICARE

## 2017-11-09 DIAGNOSIS — E78.5 DYSLIPIDEMIA: ICD-10-CM

## 2017-11-09 DIAGNOSIS — E11.9 CONTROLLED TYPE 2 DIABETES MELLITUS WITHOUT COMPLICATION, WITHOUT LONG-TERM CURRENT USE OF INSULIN: ICD-10-CM

## 2017-11-09 DIAGNOSIS — I10 ESSENTIAL HYPERTENSION: ICD-10-CM

## 2017-11-09 LAB
ALBUMIN SERPL BCP-MCNC: 4 G/DL
ALP SERPL-CCNC: 83 U/L
ALT SERPL W/O P-5'-P-CCNC: 12 U/L
ANION GAP SERPL CALC-SCNC: 10 MMOL/L
AST SERPL-CCNC: 17 U/L
BILIRUB SERPL-MCNC: 0.4 MG/DL
BUN SERPL-MCNC: 12 MG/DL
CALCIUM SERPL-MCNC: 10.1 MG/DL
CHLORIDE SERPL-SCNC: 103 MMOL/L
CHOLEST SERPL-MCNC: 175 MG/DL
CHOLEST/HDLC SERPL: 3.3 {RATIO}
CO2 SERPL-SCNC: 29 MMOL/L
CREAT SERPL-MCNC: 0.8 MG/DL
EST. GFR  (AFRICAN AMERICAN): >60 ML/MIN/1.73 M^2
EST. GFR  (NON AFRICAN AMERICAN): >60 ML/MIN/1.73 M^2
ESTIMATED AVG GLUCOSE: 105 MG/DL
GLUCOSE SERPL-MCNC: 152 MG/DL
HBA1C MFR BLD HPLC: 5.3 %
HDLC SERPL-MCNC: 53 MG/DL
HDLC SERPL: 30.3 %
LDLC SERPL CALC-MCNC: 82.8 MG/DL
NONHDLC SERPL-MCNC: 122 MG/DL
POTASSIUM SERPL-SCNC: 4.8 MMOL/L
PROT SERPL-MCNC: 7.7 G/DL
SODIUM SERPL-SCNC: 142 MMOL/L
TRIGL SERPL-MCNC: 196 MG/DL

## 2017-11-09 PROCEDURE — 80053 COMPREHEN METABOLIC PANEL: CPT

## 2017-11-09 PROCEDURE — 36415 COLL VENOUS BLD VENIPUNCTURE: CPT | Mod: PO

## 2017-11-09 PROCEDURE — 80061 LIPID PANEL: CPT

## 2017-11-09 PROCEDURE — 83036 HEMOGLOBIN GLYCOSYLATED A1C: CPT

## 2017-11-15 ENCOUNTER — OFFICE VISIT (OUTPATIENT)
Dept: FAMILY MEDICINE | Facility: CLINIC | Age: 72
End: 2017-11-15
Payer: MEDICARE

## 2017-11-15 ENCOUNTER — CLINICAL SUPPORT (OUTPATIENT)
Dept: FAMILY MEDICINE | Facility: CLINIC | Age: 72
End: 2017-11-15
Attending: INTERNAL MEDICINE
Payer: MEDICARE

## 2017-11-15 VITALS
RESPIRATION RATE: 18 BRPM | SYSTOLIC BLOOD PRESSURE: 152 MMHG | OXYGEN SATURATION: 97 % | WEIGHT: 147.94 LBS | HEART RATE: 68 BPM | DIASTOLIC BLOOD PRESSURE: 80 MMHG | HEIGHT: 60 IN | BODY MASS INDEX: 29.04 KG/M2

## 2017-11-15 DIAGNOSIS — G47.00 INSOMNIA, UNSPECIFIED TYPE: ICD-10-CM

## 2017-11-15 DIAGNOSIS — F41.1 GAD (GENERALIZED ANXIETY DISORDER): ICD-10-CM

## 2017-11-15 DIAGNOSIS — E78.5 DYSLIPIDEMIA: ICD-10-CM

## 2017-11-15 DIAGNOSIS — E11.9 CONTROLLED TYPE 2 DIABETES MELLITUS WITHOUT COMPLICATION, WITHOUT LONG-TERM CURRENT USE OF INSULIN: Primary | ICD-10-CM

## 2017-11-15 DIAGNOSIS — Z01.00 DIABETIC EYE EXAM: ICD-10-CM

## 2017-11-15 DIAGNOSIS — E11.9 DIABETIC EYE EXAM: ICD-10-CM

## 2017-11-15 DIAGNOSIS — I10 ESSENTIAL HYPERTENSION: ICD-10-CM

## 2017-11-15 LAB
CREAT UR-MCNC: 13 MG/DL
MICROALBUMIN UR DL<=1MG/L-MCNC: 181 UG/ML
MICROALBUMIN/CREATININE RATIO: 1392.3 UG/MG

## 2017-11-15 PROCEDURE — 99999 PR PBB SHADOW E&M-EST. PATIENT-LVL II: CPT | Mod: PBBFAC,,,

## 2017-11-15 PROCEDURE — 99214 OFFICE O/P EST MOD 30 MIN: CPT | Mod: S$GLB,,, | Performed by: INTERNAL MEDICINE

## 2017-11-15 PROCEDURE — 99499 UNLISTED E&M SERVICE: CPT | Mod: S$GLB,,, | Performed by: INTERNAL MEDICINE

## 2017-11-15 PROCEDURE — 82570 ASSAY OF URINE CREATININE: CPT

## 2017-11-15 PROCEDURE — 99999 PR PBB SHADOW E&M-EST. PATIENT-LVL III: CPT | Mod: PBBFAC,,, | Performed by: INTERNAL MEDICINE

## 2017-11-15 RX ORDER — LISINOPRIL 40 MG/1
40 TABLET ORAL DAILY
Qty: 90 TABLET | Refills: 1 | Status: SHIPPED | OUTPATIENT
Start: 2017-11-15 | End: 2018-02-21 | Stop reason: SDUPTHER

## 2017-11-15 RX ORDER — HYDROXYZINE HYDROCHLORIDE 25 MG/1
TABLET, FILM COATED ORAL
Qty: 60 TABLET | Refills: 11 | Status: SHIPPED | OUTPATIENT
Start: 2017-11-15 | End: 2018-11-27 | Stop reason: SDUPTHER

## 2017-11-15 RX ORDER — GLUCOSAM/CHONDRO/HERB 149/HYAL 750-100 MG
1 TABLET ORAL DAILY
COMMUNITY

## 2017-11-15 NOTE — PROGRESS NOTES
Maryann Haines is a 72 y.o. female here for a diabetic eye screening with non-dilated fundus photos per [place].    Patient cooperative?: Yes  Small pupils?: Yes  Last eye exam: 06/14/14    For exam results, see Encounter Report.

## 2017-11-15 NOTE — PROGRESS NOTES
Subjective:       Patient ID: Maryann Haines is a 72 y.o. female.    Chief Complaint: Establish Care    Hx CVA 2015 with no residual affects.  On Plavix.   DM - controlled  HTN - uncontrolled  HLD - mildly uncontrolled goal < 70; high triglycerides  Tobacco abuse - trying to quit on own.   LUIS - Ativan about once a day. ok  Insomnia - usually takes Ativan at night.     Refuses all prevention and rtc for bp check      Review of Systems   Constitutional: Negative for appetite change and fever.   HENT: Negative for nosebleeds and trouble swallowing.    Eyes: Negative for discharge and visual disturbance.   Respiratory: Negative for choking and shortness of breath.    Cardiovascular: Negative for chest pain and palpitations.   Gastrointestinal: Negative for abdominal pain, nausea and vomiting.   Musculoskeletal: Negative for arthralgias and joint swelling.   Skin: Negative for rash and wound.   Neurological: Negative for dizziness and syncope.   Psychiatric/Behavioral: Negative for confusion and dysphoric mood.       Objective:      Vitals:    11/15/17 0736   BP: (!) 152/80   Pulse: 68   Resp: 18     Physical Exam   Constitutional: She appears well-nourished.   Eyes: Conjunctivae and EOM are normal.   Neck: Normal range of motion.   Cardiovascular: Normal rate and regular rhythm.    Pulses:       Dorsalis pedis pulses are 2+ on the right side, and 2+ on the left side.   Pulmonary/Chest: Effort normal and breath sounds normal.   Musculoskeletal:   Normal ROM bilateral    Feet:   Right Foot:   Protective Sensation: 4 sites tested. 4 sites sensed.   Left Foot:   Protective Sensation: 4 sites tested. 4 sites sensed.   Neurological: No cranial nerve deficit (grossly intact).   Skin: Skin is warm and dry.   Psychiatric: She has a normal mood and affect.   Alert and orientated   Vitals reviewed.        Assessment:       1. Controlled type 2 diabetes mellitus without complication, without long-term current use of insulin    2.  Essential hypertension    3. Dyslipidemia    4. Insomnia, unspecified type    5. LUIS (generalized anxiety disorder)    6. Diabetic eye exam        Plan:       Controlled type 2 diabetes mellitus without complication, without long-term current use of insulin  -     Microalbumin/creatinine urine ratio; Future; Expected date: 05/14/2018  -     Hemoglobin A1c; Future; Expected date: 05/14/2018    Essential hypertension  -     lisinopril (PRINIVIL,ZESTRIL) 40 MG tablet; Take 1 tablet (40 mg total) by mouth once daily.  Dispense: 90 tablet; Refill: 1  -     Comprehensive metabolic panel; Future; Expected date: 05/14/2018    Dyslipidemia  -     Lipid panel; Future; Expected date: 05/14/2018    Insomnia, unspecified type  -     hydrOXYzine HCl (ATARAX) 25 MG tablet; 1 - 2 po qhs prn insomnia  Dispense: 60 tablet; Refill: 11    LUIS (generalized anxiety disorder)    Diabetic eye exam  -     Diabetic Eye Screening Photo; Future      Medication List with Changes/Refills   New Medications    HYDROXYZINE HCL (ATARAX) 25 MG TABLET    1 - 2 po qhs prn insomnia   Current Medications    ALBUTEROL 90 MCG/ACTUATION INHALER    Inhale 2 puffs into the lungs every 6 (six) hours as needed for Wheezing.    AMLODIPINE (NORVASC) 5 MG TABLET    Take 1 tablet (5 mg total) by mouth once daily.    ASPIRIN (ECOTRIN) 81 MG EC TABLET    Take 81 mg by mouth once daily.    ATENOLOL (TENORMIN) 25 MG TABLET    Take 1 tablet (25 mg total) by mouth once daily.    CLOPIDOGREL (PLAVIX) 75 MG TABLET    TAKE 1 TABLET BY MOUTH ONCE DAILY    HYDROCHLOROTHIAZIDE (HYDRODIURIL) 25 MG TABLET    Take 1 tablet (25 mg total) by mouth once daily.    LORAZEPAM (ATIVAN) 1 MG TABLET    TAKE 1 TABLET(1 MG) BY MOUTH TWICE DAILY AS NEEDED    METFORMIN (GLUCOPHAGE) 500 MG TABLET    Take 1 tablet (500 mg total) by mouth 2 (two) times daily with meals.    NIACIN (SLO-NIACIN) 500 MG TBSR    Take 500 mg by mouth nightly.    OMEGA 3-DHA-EPA-FISH OIL (FISH OIL) 1,000 MG (120 MG-180  "MG) CAP    Take 1 capsule by mouth once daily.    PRAVASTATIN (PRAVACHOL) 40 MG TABLET    Take 1 tablet (40 mg total) by mouth once daily.   Changed and/or Refilled Medications    Modified Medication Previous Medication    LISINOPRIL (PRINIVIL,ZESTRIL) 40 MG TABLET lisinopril (PRINIVIL,ZESTRIL) 20 MG tablet       Take 1 tablet (40 mg total) by mouth once daily.    TAKE 1 TABLET(20 MG) BY MOUTH EVERY DAY   Discontinued Medications    NYSTATIN (MYCOSTATIN) CREAM    Apply topically 2 (two) times daily.       bp nurse in 3 wk        Counseled on regular exercise, maintenance of a healthy weight, balanced diet rich in fruits/vegetables and lean protein, and avoidance of unhealthy habits like smoking and excessive alcohol intake.   Also, counseled on importance of being compliant with medication, health appointments, diet and exercise.     Return in about 6 months (around 5/15/2018).ok refill Ativan to 6mo f/u    "This note will not be shared with the patient."  "

## 2017-11-16 PROCEDURE — 92250 FUNDUS PHOTOGRAPHY W/I&R: CPT | Mod: ,,, | Performed by: OPTOMETRIST

## 2017-11-17 DIAGNOSIS — Z12.11 COLON CANCER SCREENING: ICD-10-CM

## 2017-11-20 RX ORDER — PRAVASTATIN SODIUM 40 MG/1
40 TABLET ORAL DAILY
Qty: 90 TABLET | Refills: 0 | Status: SHIPPED | OUTPATIENT
Start: 2017-11-20 | End: 2018-02-27 | Stop reason: SDUPTHER

## 2017-11-20 NOTE — TELEPHONE ENCOUNTER
----- Message from Ana Gonzalez sent at 11/20/2017  9:40 AM CST -----  Contact: self  Needs new prescription for pravastatin (PRAVACHOL) 40 MG tablet. Please call back at [phone] (home)     [facility] [employer] [address] [place] [address] [id number] [place] [address] AT [facility] [address]  [address] [place] [address]  Phone: [phone] Fax: [phone]

## 2017-12-21 RX ORDER — LORAZEPAM 1 MG/1
TABLET ORAL
Qty: 60 TABLET | Refills: 0 | Status: SHIPPED | OUTPATIENT
Start: 2017-12-21 | End: 2018-02-05 | Stop reason: SDUPTHER

## 2017-12-21 NOTE — TELEPHONE ENCOUNTER
----- Message from Katie Rizvi sent at 12/21/2017  8:46 AM CST -----  Contact: patient  Patient calling to request a refill for Lorazepam. Please advise.   Call back   Thanks!    [facility] [address] [place] [address] [address] [place] [address] AT [facility] [address]  [address] [place] [address]  Phone: [phone] Fax: [phone]

## 2018-01-10 DIAGNOSIS — I10 HYPERTENSION, UNSPECIFIED TYPE: ICD-10-CM

## 2018-01-11 RX ORDER — AMLODIPINE BESYLATE 5 MG/1
TABLET ORAL
Qty: 90 TABLET | Refills: 1 | Status: SHIPPED | OUTPATIENT
Start: 2018-01-11 | End: 2018-04-26 | Stop reason: SDUPTHER

## 2018-01-15 DIAGNOSIS — I63.9 CEREBROVASCULAR ACCIDENT (CVA), UNSPECIFIED MECHANISM: ICD-10-CM

## 2018-01-16 RX ORDER — CLOPIDOGREL BISULFATE 75 MG/1
TABLET ORAL
Qty: 90 TABLET | Refills: 1 | Status: SHIPPED | OUTPATIENT
Start: 2018-01-16 | End: 2018-05-15 | Stop reason: SDUPTHER

## 2018-01-30 PROBLEM — J44.1 COPD WITH EXACERBATION: Status: ACTIVE | Noted: 2018-01-30

## 2018-01-30 PROBLEM — G93.40 ENCEPHALOPATHY ACUTE: Status: ACTIVE | Noted: 2018-01-30

## 2018-01-30 PROBLEM — R09.89 WIDENED PULSE PRESSURE: Status: ACTIVE | Noted: 2018-01-30

## 2018-01-30 PROBLEM — J18.9 COMMUNITY ACQUIRED PNEUMONIA OF RIGHT LOWER LOBE OF LUNG: Status: ACTIVE | Noted: 2018-01-30

## 2018-01-30 PROBLEM — R91.1 LUNG NODULE: Status: ACTIVE | Noted: 2018-01-30

## 2018-01-30 PROBLEM — A41.9 SEPSIS: Status: ACTIVE | Noted: 2018-01-30

## 2018-02-05 DIAGNOSIS — E78.5 HYPERLIPIDEMIA, UNSPECIFIED HYPERLIPIDEMIA TYPE: ICD-10-CM

## 2018-02-05 RX ORDER — METFORMIN HYDROCHLORIDE 500 MG/1
500 TABLET ORAL 2 TIMES DAILY WITH MEALS
Qty: 180 TABLET | Refills: 1 | Status: SHIPPED | OUTPATIENT
Start: 2018-02-05 | End: 2018-08-17 | Stop reason: SDUPTHER

## 2018-02-05 RX ORDER — LORAZEPAM 1 MG/1
TABLET ORAL
Qty: 60 TABLET | Refills: 0 | Status: SHIPPED | OUTPATIENT
Start: 2018-02-05 | End: 2018-03-09 | Stop reason: SDUPTHER

## 2018-02-05 RX ORDER — LANOLIN ALCOHOL/MO/W.PET/CERES
500 CREAM (GRAM) TOPICAL NIGHTLY
Qty: 90 TABLET | Refills: 3 | Status: SHIPPED | OUTPATIENT
Start: 2018-02-05

## 2018-02-05 RX ORDER — ATENOLOL 25 MG/1
25 TABLET ORAL DAILY
Qty: 90 TABLET | Refills: 3 | Status: SHIPPED | OUTPATIENT
Start: 2018-02-05

## 2018-02-05 NOTE — TELEPHONE ENCOUNTER
----- Message from Jillian Jordan sent at 2/5/2018  7:59 AM CST -----  Contact: Maryann  Patient is needing a refill on 4 meds  Rx metFORMIN (GLUCOPHAGE) 500 MG tablet  Rx atenolol (TENORMIN) 25 MG tablet  Rx niacin (SLO-NIACIN) 500 mg TbSR  Rx LORazepam (ATIVAN) 1 MG tablet    [facility] [address] [place] [address] [id number] [place] [address] AT [facility] OF [facility] & [facility]  [address] [place] [address]  Phone: [phone] Fax: [phone]    Thanks!

## 2018-02-21 DIAGNOSIS — I10 ESSENTIAL HYPERTENSION: ICD-10-CM

## 2018-02-21 NOTE — TELEPHONE ENCOUNTER
----- Message from Xenia Mccurdy sent at 2/21/2018  7:34 AM CST -----  Contact: Patient  Maryann, [phone], Patient is calling for a refill on Rx isinopril (PRINIVIL,ZESTRIL) 40 MG tablet. Please advise. Thanks.  [facility] [employer] [address] [address] [address]  Phone: [phone] Fax: [phone]

## 2018-02-22 RX ORDER — LISINOPRIL 40 MG/1
40 TABLET ORAL DAILY
Qty: 90 TABLET | Refills: 1 | Status: SHIPPED | OUTPATIENT
Start: 2018-02-22 | End: 2018-09-20 | Stop reason: SDUPTHER

## 2018-02-27 NOTE — TELEPHONE ENCOUNTER
----- Message from Eunice Vila sent at 2/27/2018  9:58 AM CST -----  Refill on Rx Pravastatin 40 mg.  Please send into [facility] [place]/[phone].  Any questions call [phone].

## 2018-03-01 RX ORDER — PRAVASTATIN SODIUM 40 MG/1
40 TABLET ORAL DAILY
Qty: 90 TABLET | Refills: 1 | Status: SHIPPED | OUTPATIENT
Start: 2018-03-01 | End: 2018-05-15 | Stop reason: SDUPTHER

## 2018-03-09 NOTE — TELEPHONE ENCOUNTER
----- Message from Jennifer Viramontes sent at 3/9/2018  1:47 PM CST -----  Contact: self  Patient [phone] is requesting a refill on Lorazepam  1mg - takes one tab daily    [facility] [address] - [facility] [address] AT [facility] [address]  [address] [address] [address]  Phone: [phone] Fax: [phone]

## 2018-03-12 RX ORDER — LORAZEPAM 1 MG/1
TABLET ORAL
Qty: 60 TABLET | Refills: 0 | Status: SHIPPED | OUTPATIENT
Start: 2018-03-12 | End: 2018-05-15 | Stop reason: SDUPTHER

## 2018-04-26 DIAGNOSIS — I10 HYPERTENSION, UNSPECIFIED TYPE: ICD-10-CM

## 2018-04-26 NOTE — TELEPHONE ENCOUNTER
----- Message from Anusha Nolasco sent at 4/26/2018  8:20 AM CDT -----  Contact: [phone]  Patient requesting a refill on atenolol 25mg, amlodipine besylate 5mg, and lorazepam 1mg.      Patient will be using   [facility] [address] [place] [address] [address] [place] [address] AT [facility] [address]  [address] [place] [address]  Phone: [phone] Fax: [phone]    Please call patient at [phone]. Thanks!

## 2018-04-26 NOTE — TELEPHONE ENCOUNTER
Spoke with pt. Notified that atenolol should have refills at pharmacy.  Instructed pt that too early to fill lorazepam, should last until approx May 10th, as order is for once daily.  Pt states that she thought it was twice daily but is willing to wait until next due.

## 2018-04-27 RX ORDER — AMLODIPINE BESYLATE 5 MG/1
TABLET ORAL
Qty: 90 TABLET | Refills: 1 | Status: SHIPPED | OUTPATIENT
Start: 2018-04-27 | End: 2018-10-20 | Stop reason: SDUPTHER

## 2018-05-01 ENCOUNTER — PATIENT OUTREACH (OUTPATIENT)
Dept: ADMINISTRATIVE | Facility: HOSPITAL | Age: 73
End: 2018-05-01

## 2018-05-01 NOTE — PROGRESS NOTES
Health Maintenance Due   Topic Date Due    Fecal Occult Blood Test (FOBT)/[employer]  1943    Pneumococcal (65+) (1 of 2 - PCV13) 07/24/2008     Pre-visit outreach via mail

## 2018-05-01 NOTE — LETTER
May 1, 2018    Maryann Haines  [address]  [facility] [address]             [facility]  [address]  [facility] [address]  Phone: [phone] Dear Ms. Haines:    [facility] is committed to your overall health.  To help you get the most out of each of your visits, we will review your information to make sure you are up to date on all of your recommended tests and/or procedures.      Dr. Mauro  has found that your chart shows you may be due for the following:    Pneumonia immunization  Colon cancer screening  Mammogram  Osteoporosis screening (DEXA SCAN)  Tetanus Immunization  Shingles immunization    REMINDER: lab appointment 5/8/18    If you have had any of the above done at another facility, please bring the records or information with you so that your record at [facility] will be complete.  If you would like to schedule any of these, please contact me.    If you are currently taking medication, please bring it with you to your appointment for review.    If you have any questions or concerns, please don't hesitate to call.    Sincerely,    Mildred Quiñonez  Clinical Care Coordinator  [facility] [address] [place] [address]  Phone: [phone]   Fax: [phone]

## 2018-05-08 ENCOUNTER — LAB VISIT (OUTPATIENT)
Dept: LAB | Facility: HOSPITAL | Age: 73
End: 2018-05-08
Attending: INTERNAL MEDICINE
Payer: MEDICARE

## 2018-05-08 ENCOUNTER — TELEPHONE (OUTPATIENT)
Dept: FAMILY MEDICINE | Facility: CLINIC | Age: 73
End: 2018-05-08

## 2018-05-08 DIAGNOSIS — E11.9 CONTROLLED TYPE 2 DIABETES MELLITUS WITHOUT COMPLICATION, WITHOUT LONG-TERM CURRENT USE OF INSULIN: ICD-10-CM

## 2018-05-08 DIAGNOSIS — I10 ESSENTIAL HYPERTENSION: ICD-10-CM

## 2018-05-08 DIAGNOSIS — E78.5 DYSLIPIDEMIA: ICD-10-CM

## 2018-05-08 DIAGNOSIS — R82.90 ABNORMAL URINALYSIS: Primary | ICD-10-CM

## 2018-05-08 LAB
ALBUMIN SERPL BCP-MCNC: 4 G/DL
ALP SERPL-CCNC: 99 U/L
ALT SERPL W/O P-5'-P-CCNC: 9 U/L
ANION GAP SERPL CALC-SCNC: 13 MMOL/L
AST SERPL-CCNC: 13 U/L
BILIRUB SERPL-MCNC: 0.4 MG/DL
BUN SERPL-MCNC: 12 MG/DL
CALCIUM SERPL-MCNC: 10.1 MG/DL
CHLORIDE SERPL-SCNC: 98 MMOL/L
CHOLEST SERPL-MCNC: 181 MG/DL
CHOLEST/HDLC SERPL: 5 {RATIO}
CO2 SERPL-SCNC: 25 MMOL/L
CREAT SERPL-MCNC: 0.8 MG/DL
EST. GFR  (AFRICAN AMERICAN): >60 ML/MIN/1.73 M^2
EST. GFR  (NON AFRICAN AMERICAN): >60 ML/MIN/1.73 M^2
ESTIMATED AVG GLUCOSE: 117 MG/DL
GLUCOSE SERPL-MCNC: 173 MG/DL
HBA1C MFR BLD HPLC: 5.7 %
HDLC SERPL-MCNC: 36 MG/DL
HDLC SERPL: 19.9 %
LDLC SERPL CALC-MCNC: 113.4 MG/DL
NONHDLC SERPL-MCNC: 145 MG/DL
POTASSIUM SERPL-SCNC: 4 MMOL/L
PROT SERPL-MCNC: 7.6 G/DL
SODIUM SERPL-SCNC: 136 MMOL/L
TRIGL SERPL-MCNC: 158 MG/DL

## 2018-05-08 PROCEDURE — 83036 HEMOGLOBIN GLYCOSYLATED A1C: CPT

## 2018-05-08 PROCEDURE — 80061 LIPID PANEL: CPT

## 2018-05-08 PROCEDURE — 36415 COLL VENOUS BLD VENIPUNCTURE: CPT | Mod: PO

## 2018-05-08 PROCEDURE — 80053 COMPREHEN METABOLIC PANEL: CPT

## 2018-05-14 ENCOUNTER — TELEPHONE (OUTPATIENT)
Dept: FAMILY MEDICINE | Facility: CLINIC | Age: 73
End: 2018-05-14

## 2018-05-15 ENCOUNTER — OFFICE VISIT (OUTPATIENT)
Dept: FAMILY MEDICINE | Facility: CLINIC | Age: 73
End: 2018-05-15
Payer: MEDICARE

## 2018-05-15 VITALS
DIASTOLIC BLOOD PRESSURE: 72 MMHG | BODY MASS INDEX: 23.71 KG/M2 | TEMPERATURE: 98 F | OXYGEN SATURATION: 94 % | RESPIRATION RATE: 18 BRPM | WEIGHT: 133.81 LBS | HEART RATE: 58 BPM | HEIGHT: 63 IN | SYSTOLIC BLOOD PRESSURE: 126 MMHG

## 2018-05-15 DIAGNOSIS — I10 ESSENTIAL HYPERTENSION: ICD-10-CM

## 2018-05-15 DIAGNOSIS — E11.9 CONTROLLED TYPE 2 DIABETES MELLITUS WITHOUT COMPLICATION, WITHOUT LONG-TERM CURRENT USE OF INSULIN: ICD-10-CM

## 2018-05-15 DIAGNOSIS — R06.2 WHEEZING: ICD-10-CM

## 2018-05-15 DIAGNOSIS — F41.1 GAD (GENERALIZED ANXIETY DISORDER): ICD-10-CM

## 2018-05-15 DIAGNOSIS — E78.5 DYSLIPIDEMIA: ICD-10-CM

## 2018-05-15 DIAGNOSIS — J44.0 COPD (CHRONIC OBSTRUCTIVE PULMONARY DISEASE) WITH ACUTE BRONCHITIS: ICD-10-CM

## 2018-05-15 DIAGNOSIS — J20.9 COPD (CHRONIC OBSTRUCTIVE PULMONARY DISEASE) WITH ACUTE BRONCHITIS: ICD-10-CM

## 2018-05-15 DIAGNOSIS — Z00.00 ROUTINE PHYSICAL EXAMINATION: Primary | ICD-10-CM

## 2018-05-15 PROCEDURE — 99499 UNLISTED E&M SERVICE: CPT | Mod: S$PBB,,, | Performed by: INTERNAL MEDICINE

## 2018-05-15 PROCEDURE — 3044F HG A1C LEVEL LT 7.0%: CPT | Mod: CPTII,S$GLB,, | Performed by: INTERNAL MEDICINE

## 2018-05-15 PROCEDURE — 3074F SYST BP LT 130 MM HG: CPT | Mod: CPTII,S$GLB,, | Performed by: INTERNAL MEDICINE

## 2018-05-15 PROCEDURE — 99999 PR PBB SHADOW E&M-EST. PATIENT-LVL III: CPT | Mod: PBBFAC,,, | Performed by: INTERNAL MEDICINE

## 2018-05-15 PROCEDURE — 99397 PER PM REEVAL EST PAT 65+ YR: CPT | Mod: S$GLB,,, | Performed by: INTERNAL MEDICINE

## 2018-05-15 PROCEDURE — 3078F DIAST BP <80 MM HG: CPT | Mod: CPTII,S$GLB,, | Performed by: INTERNAL MEDICINE

## 2018-05-15 RX ORDER — FLUTICASONE PROPIONATE 50 MCG
2 SPRAY, SUSPENSION (ML) NASAL DAILY
Qty: 16 G | Refills: 11 | Status: SHIPPED | OUTPATIENT
Start: 2018-05-15

## 2018-05-15 RX ORDER — AZITHROMYCIN 250 MG/1
TABLET, FILM COATED ORAL
Qty: 6 TABLET | Refills: 0 | Status: SHIPPED | OUTPATIENT
Start: 2018-05-15 | End: 2018-08-25

## 2018-05-15 RX ORDER — PRAVASTATIN SODIUM 40 MG/1
40 TABLET ORAL DAILY
Qty: 90 TABLET | Refills: 1 | Status: SHIPPED | OUTPATIENT
Start: 2018-05-15 | End: 2018-10-29 | Stop reason: SDUPTHER

## 2018-05-15 RX ORDER — ALBUTEROL SULFATE 90 UG/1
2 AEROSOL, METERED RESPIRATORY (INHALATION) EVERY 6 HOURS PRN
Qty: 18 G | Refills: 0 | Status: SHIPPED | OUTPATIENT
Start: 2018-05-15 | End: 2018-06-14 | Stop reason: SDUPTHER

## 2018-05-15 RX ORDER — LORAZEPAM 1 MG/1
TABLET ORAL
Qty: 30 TABLET | Refills: 5 | Status: SHIPPED | OUTPATIENT
Start: 2018-05-15

## 2018-05-15 RX ORDER — FLUTICASONE PROPIONATE AND SALMETEROL 250; 50 UG/1; UG/1
1 POWDER RESPIRATORY (INHALATION) 2 TIMES DAILY
Qty: 60 EACH | Refills: 11 | Status: SHIPPED | OUTPATIENT
Start: 2018-05-15 | End: 2019-05-15

## 2018-05-15 NOTE — PROGRESS NOTES
Subjective:       Patient ID: Maryann Haines is a 72 y.o. female.    Chief Complaint: Annual Exam    Here for routine health maintenance.    Hx CVA 2015 with no residual affects.  On Plavix.   DM - controlled  HTN - uncontrolled  HLD - mildly uncontrolled goal < 70; high triglycerides  Tobacco abuse - trying to quit on own.   LUIS - Ativan about once a day. ok  Insomnia - usually takes Ativan at night.   COPD - = wheezing and wet cough.  No sob    Refuses to get PFT, FOBT  Refuses all prevention and rtc for bp check      Review of Systems   Constitutional: Negative for appetite change and fever.   HENT: Negative for nosebleeds and trouble swallowing.    Eyes: Negative for discharge and visual disturbance.   Respiratory: Positive for cough. Negative for choking and shortness of breath.    Cardiovascular: Negative for chest pain and palpitations.   Gastrointestinal: Negative for abdominal pain, nausea and vomiting.   Musculoskeletal: Negative for arthralgias and joint swelling.   Skin: Negative for rash and wound.   Neurological: Negative for dizziness and syncope.   Psychiatric/Behavioral: Negative for confusion and dysphoric mood.       Objective:      Vitals:    05/15/18 0731   BP: 126/72   Pulse: (!) 58   Resp: 18   Temp: 98.1 °F (36.7 °C)     Physical Exam   Constitutional: She appears well-nourished.   Eyes: Conjunctivae and EOM are normal.   Neck: Trachea normal and normal range of motion. No thyromegaly present.   Cardiovascular: Normal heart sounds.    Edema negative   Pulmonary/Chest: Effort normal. She has wheezes.   Abdominal: Soft. There is no hepatomegaly.   Neurological: No cranial nerve deficit.   DTR decreased bilateral   Skin: Skin is warm, dry and intact.   Psychiatric: She has a normal mood and affect.   Alert and Oriented    Vitals reviewed.        Assessment:       1. Routine physical examination    2. Wheezing    3. COPD (chronic obstructive pulmonary disease) with acute bronchitis    4.  Controlled type 2 diabetes mellitus without complication, without long-term current use of insulin    5. Essential hypertension    6. Dyslipidemia    7. LUIS (generalized anxiety disorder)        Plan:       Routine physical examination    Wheezing  -     albuterol 90 mcg/actuation inhaler; Inhale 2 puffs into the lungs every 6 (six) hours as needed for Wheezing.  Dispense: 18 g; Refill: 0    COPD (chronic obstructive pulmonary disease) with acute bronchitis  -     albuterol 90 mcg/actuation inhaler; Inhale 2 puffs into the lungs every 6 (six) hours as needed for Wheezing.  Dispense: 18 g; Refill: 0  -     azithromycin (ZITHROMAX Z-ANDRES) 250 MG tablet; Take 2 po day 1, then 1 po day 2 - 5  Dispense: 6 tablet; Refill: 0  -     fluticasone (FLONASE) 50 mcg/actuation nasal spray; 2 sprays (100 mcg total) by Each Nare route once daily.  Dispense: 16 g; Refill: 11  -     fluticasone-salmeterol 250-50 mcg/dose (ADVAIR) 250-50 mcg/dose diskus inhaler; Inhale 1 puff into the lungs 2 (two) times daily. Controller  Dispense: 60 each; Refill: 11  -     CBC auto differential; Future; Expected date: 11/11/2018    Controlled type 2 diabetes mellitus without complication, without long-term current use of insulin  -     Hemoglobin A1c; Future; Expected date: 11/11/2018  -     Microalbumin/creatinine urine ratio; Future; Expected date: 11/11/2018    Essential hypertension  -     Comprehensive metabolic panel; Future; Expected date: 11/11/2018    Dyslipidemia  -     pravastatin (PRAVACHOL) 40 MG tablet; Take 1 tablet (40 mg total) by mouth once daily.  Dispense: 90 tablet; Refill: 1  -     Lipid panel; Future; Expected date: 11/11/2018    LUIS (generalized anxiety disorder)  -     LORazepam (ATIVAN) 1 MG tablet; TAKE 1 TABLET(1 MG) BY MOUTH DAILY AS NEEDED  Dispense: 30 tablet; Refill: 5            Medication List with Changes/Refills   New Medications    AZITHROMYCIN (ZITHROMAX Z-ANDRES) 250 MG TABLET    Take 2 po day 1, then 1 po day 2 -  5    FLUTICASONE (FLONASE) 50 MCG/ACTUATION NASAL SPRAY    2 sprays (100 mcg total) by Each Nare route once daily.    FLUTICASONE-SALMETEROL 250-50 MCG/DOSE (ADVAIR) 250-50 MCG/DOSE DISKUS INHALER    Inhale 1 puff into the lungs 2 (two) times daily. Controller   Current Medications    AMLODIPINE (NORVASC) 5 MG TABLET    TAKE 1 TABLET(5 MG) BY MOUTH EVERY DAY    ASPIRIN (ECOTRIN) 81 MG EC TABLET    Take 81 mg by mouth once daily.    ATENOLOL (TENORMIN) 25 MG TABLET    Take 1 tablet (25 mg total) by mouth once daily.    CLOPIDOGREL (PLAVIX) 75 MG TABLET    TAKE 1 TABLET BY MOUTH ONCE DAILY    HYDROCHLOROTHIAZIDE (HYDRODIURIL) 25 MG TABLET    Take 1 tablet (25 mg total) by mouth once daily.    HYDROXYZINE HCL (ATARAX) 25 MG TABLET    1 - 2 po qhs prn insomnia    LISINOPRIL (PRINIVIL,ZESTRIL) 40 MG TABLET    Take 1 tablet (40 mg total) by mouth once daily.    METFORMIN (GLUCOPHAGE) 500 MG TABLET    Take 1 tablet (500 mg total) by mouth 2 (two) times daily with meals.    NIACIN (SLO-NIACIN) 500 MG TBSR    Take 500 mg by mouth nightly.    OMEGA 3-DHA-EPA-FISH OIL (FISH OIL) 1,000 MG (120 MG-180 MG) CAP    Take 1 capsule by mouth once daily.   Changed and/or Refilled Medications    Modified Medication Previous Medication    ALBUTEROL 90 MCG/ACTUATION INHALER albuterol 90 mcg/actuation inhaler       Inhale 2 puffs into the lungs every 6 (six) hours as needed for Wheezing.    Inhale 2 puffs into the lungs every 6 (six) hours as needed for Wheezing.    LORAZEPAM (ATIVAN) 1 MG TABLET LORazepam (ATIVAN) 1 MG tablet       TAKE 1 TABLET(1 MG) BY MOUTH DAILY AS NEEDED    TAKE 1 TABLET(1 MG) BY MOUTH DAILY AS NEEDED    PRAVASTATIN (PRAVACHOL) 40 MG TABLET pravastatin (PRAVACHOL) 40 MG tablet       Take 1 tablet (40 mg total) by mouth once daily.    Take 1 tablet (40 mg total) by mouth once daily.   Discontinued Medications    CLOPIDOGREL (PLAVIX) 75 MG TABLET    TAKE 1 TABLET BY MOUTH ONCE DAILY     Wellness reviewed    Continue  "current management    Counseled on regular exercise, maintenance of a healthy weight, balanced diet rich in fruits/vegetables and lean protein, and avoidance of unhealthy habits like smoking and excessive alcohol intake.   Also, counseled on importance of being compliant with medication, health appointments, diet and exercise.     Follow-up in about 6 months (around 11/15/2018).    "This note will not be shared with the patient."  "

## 2018-06-14 DIAGNOSIS — J44.0 COPD (CHRONIC OBSTRUCTIVE PULMONARY DISEASE) WITH ACUTE BRONCHITIS: ICD-10-CM

## 2018-06-14 DIAGNOSIS — R06.2 WHEEZING: ICD-10-CM

## 2018-06-14 DIAGNOSIS — J20.9 COPD (CHRONIC OBSTRUCTIVE PULMONARY DISEASE) WITH ACUTE BRONCHITIS: ICD-10-CM

## 2018-06-15 RX ORDER — ALBUTEROL SULFATE 90 UG/1
AEROSOL, METERED RESPIRATORY (INHALATION)
Qty: 18 G | Refills: 11 | Status: SHIPPED | OUTPATIENT
Start: 2018-06-15

## 2018-07-27 DIAGNOSIS — I63.9 CEREBROVASCULAR ACCIDENT (CVA), UNSPECIFIED MECHANISM: ICD-10-CM

## 2018-07-30 RX ORDER — CLOPIDOGREL BISULFATE 75 MG/1
TABLET ORAL
Qty: 90 TABLET | Refills: 3 | Status: SHIPPED | OUTPATIENT
Start: 2018-07-30

## 2018-08-17 RX ORDER — METFORMIN HYDROCHLORIDE 500 MG/1
TABLET ORAL
Qty: 180 TABLET | Refills: 1 | Status: SHIPPED | OUTPATIENT
Start: 2018-08-17

## 2018-08-25 ENCOUNTER — NURSE TRIAGE (OUTPATIENT)
Dept: ADMINISTRATIVE | Facility: CLINIC | Age: 73
End: 2018-08-25

## 2018-08-25 ENCOUNTER — OFFICE VISIT (OUTPATIENT)
Dept: URGENT CARE | Facility: CLINIC | Age: 73
End: 2018-08-25
Payer: MEDICARE

## 2018-08-25 VITALS
OXYGEN SATURATION: 96 % | TEMPERATURE: 97 F | SYSTOLIC BLOOD PRESSURE: 169 MMHG | RESPIRATION RATE: 18 BRPM | HEART RATE: 89 BPM | WEIGHT: 133 LBS | DIASTOLIC BLOOD PRESSURE: 77 MMHG | BODY MASS INDEX: 23.57 KG/M2 | HEIGHT: 63 IN

## 2018-08-25 DIAGNOSIS — J20.9 ACUTE WHEEZY BRONCHITIS: Primary | ICD-10-CM

## 2018-08-25 PROCEDURE — 3078F DIAST BP <80 MM HG: CPT | Mod: CPTII,S$GLB,, | Performed by: NURSE PRACTITIONER

## 2018-08-25 PROCEDURE — 3077F SYST BP >= 140 MM HG: CPT | Mod: CPTII,S$GLB,, | Performed by: NURSE PRACTITIONER

## 2018-08-25 PROCEDURE — 99214 OFFICE O/P EST MOD 30 MIN: CPT | Mod: S$GLB,,, | Performed by: NURSE PRACTITIONER

## 2018-08-25 RX ORDER — AZITHROMYCIN 1 G/1
1 POWDER, FOR SUSPENSION ORAL ONCE
Qty: 1 PACKET | Refills: 0 | Status: SHIPPED | OUTPATIENT
Start: 2018-08-25 | End: 2018-08-25

## 2018-08-25 NOTE — PROGRESS NOTES
"Subjective:       Patient ID: Maryann Haines is a 73 y.o. female.    Vitals:  height is 5' 3" (1.6 m) and weight is 60.3 kg (133 lb). Her oral temperature is 97.2 °F (36.2 °C). Her blood pressure is 169/77 (abnormal) and her pulse is 89. Her respiration is 18 and oxygen saturation is 96%.     Chief Complaint: Cough    Pt states that cough and congestion in chest getting worse for 5 days- felt as if she has had fever the last 2 days   Visitor had upper resp infection   Smoker        Cough   This is a new problem. The current episode started in the past 7 days. The problem has been gradually worsening. The problem occurs constantly. The cough is productive of sputum. Associated symptoms include wheezing. Pertinent negatives include no chest pain, chills, ear pain, eye redness, fever, headaches, myalgias, sore throat or shortness of breath. Nothing aggravates the symptoms. She has tried OTC cough suppressant for the symptoms. The treatment provided no relief. Her past medical history is significant for COPD. There is no history of pneumonia.     Review of Systems   Constitution: Negative for chills, fever and malaise/fatigue.   HENT: Positive for congestion. Negative for ear pain, hoarse voice and sore throat.    Eyes: Negative for discharge and redness.   Cardiovascular: Negative for chest pain, dyspnea on exertion and leg swelling.   Respiratory: Positive for cough and wheezing. Negative for shortness of breath and sputum production.    Musculoskeletal: Negative for myalgias.   Gastrointestinal: Negative for abdominal pain and nausea.   Neurological: Negative for headaches.       Objective:      Physical Exam   Constitutional: She is oriented to person, place, and time. She appears well-developed and well-nourished. She is cooperative.  Non-toxic appearance. She does not appear ill. No distress.   HENT:   Head: Normocephalic and atraumatic.   Right Ear: Tympanic membrane, external ear and ear canal normal.   Left " Ear: Tympanic membrane, external ear and ear canal normal.   Nose: Nose normal. No mucosal edema, rhinorrhea or nasal deformity. No epistaxis. Right sinus exhibits no maxillary sinus tenderness and no frontal sinus tenderness. Left sinus exhibits no maxillary sinus tenderness and no frontal sinus tenderness.   Mouth/Throat: Uvula is midline, oropharynx is clear and moist and mucous membranes are normal. Normal dentition. No uvula swelling. No posterior oropharyngeal erythema.   Eyes: Conjunctivae and lids are normal. No scleral icterus.   Sclera clear bilat   Neck: Trachea normal, full passive range of motion without pain and phonation normal. Neck supple.   Cardiovascular: Normal rate, regular rhythm, normal heart sounds, intact distal pulses and normal pulses.   Pulmonary/Chest: Effort normal. No respiratory distress. She has wheezes.   Audible wheezing    Abdominal: Normal appearance.   Musculoskeletal: Normal range of motion. She exhibits no edema or deformity.   Neurological: She is alert and oriented to person, place, and time. She exhibits normal muscle tone. Coordination normal.   Skin: Skin is warm, dry and intact. She is not diaphoretic. No pallor.   Psychiatric: She has a normal mood and affect. Her speech is normal and behavior is normal. Judgment and thought content normal. Cognition and memory are normal.   Nursing note and vitals reviewed.      Assessment:       1. Acute wheezy bronchitis        Plan:         Acute wheezy bronchitis    Other orders  -     azithromycin (ZITHROMAX) 1 gram Pack; Take 1 g by mouth once. for 1 dose  Dispense: 1 packet; Refill: 0      Patient Instructions   Continue your breathing treatments at home and start your antibiotic

## 2018-08-25 NOTE — TELEPHONE ENCOUNTER
"Have a respiratory infection. Had a friend to visit with bronchitis and now thinks she thinks that she has an infection.    Reason for Disposition   [1] MILD difficulty breathing (e.g., minimal/no SOB at rest, SOB with walking, pulse <100) AND [2] NEW-onset or WORSE than normal    Answer Assessment - Initial Assessment Questions  1. RESPIRATORY STATUS: "Describe your breathing?" (e.g., wheezing, shortness of breath, unable to speak, severe coughing)       Crackles with expiration  2. ONSET: "When did this breathing problem begin?"       4 days ago  3. PATTERN "Does the difficult breathing come and go, or has it been constant since it started?"       Can sleep at night  4. SEVERITY: "How bad is your breathing?" (e.g., mild, moderate, severe)     - MILD: No SOB at rest, mild SOB with walking, speaks normally in sentences, can lay down, no retractions, pulse < 100.     - MODERATE: SOB at rest, SOB with minimal exertion and prefers to sit, cannot lie down flat, speaks in phrases, mild retractions, audible wheezing, pulse 100-120.     - SEVERE: Very SOB at rest, speaks in single words, struggling to breathe, sitting hunched forward, retractions, pulse > 120       Audible crackles  5. RECURRENT SYMPTOM: "Have you had difficulty breathing before?" If so, ask: "When was the last time?" and "What happened that time?"       Yes has had bronchitis  6. CARDIAC HISTORY: "Do you have any history of heart disease?" (e.g., heart attack, angina, bypass surgery, angioplasty)       denies  7. LUNG HISTORY: "Do you have any history of lung disease?"  (e.g., pulmonary embolus, asthma, emphysema)      bronchitis  8. CAUSE: "What do you think is causing the breathing problem?"       Got infection from friend  9. OTHER SYMPTOMS: "Do you have any other symptoms? (e.g., dizziness, runny nose, cough, chest pain, fever)      cough  10. PREGNANCY: "Is there any chance you are pregnant?" "When was your last menstrual period?"        no  11. " "TRAVEL: "Have you traveled out of the country in the last month?" (e.g., travel history, exposures)        no    Protocols used: ST BREATHING DIFFICULTY-A-AH      "

## 2018-08-27 ENCOUNTER — TELEPHONE (OUTPATIENT)
Dept: FAMILY MEDICINE | Facility: CLINIC | Age: 73
End: 2018-08-27

## 2018-08-27 NOTE — TELEPHONE ENCOUNTER
----- Message from Mabel Rahman sent at 8/27/2018  9:09 AM CDT -----  Contact: pt  Pt returned a call to speak with nurse Max Grier  Please call pt to advise  Call back   Thanks

## 2018-09-17 DIAGNOSIS — I10 ESSENTIAL HYPERTENSION: ICD-10-CM

## 2018-09-19 RX ORDER — LISINOPRIL 40 MG/1
TABLET ORAL
Qty: 90 TABLET | Refills: 2 | Status: SHIPPED | OUTPATIENT
Start: 2018-09-19

## 2018-09-20 DIAGNOSIS — I10 ESSENTIAL HYPERTENSION: ICD-10-CM

## 2018-09-20 RX ORDER — LISINOPRIL 40 MG/1
TABLET ORAL
Qty: 90 TABLET | Refills: 1 | Status: SHIPPED | OUTPATIENT
Start: 2018-09-20

## 2018-09-28 ENCOUNTER — NURSE TRIAGE (OUTPATIENT)
Dept: ADMINISTRATIVE | Facility: CLINIC | Age: 73
End: 2018-09-28

## 2018-09-29 ENCOUNTER — OFFICE VISIT (OUTPATIENT)
Dept: URGENT CARE | Facility: CLINIC | Age: 73
End: 2018-09-29
Payer: MEDICARE

## 2018-09-29 VITALS
HEART RATE: 91 BPM | RESPIRATION RATE: 12 BRPM | DIASTOLIC BLOOD PRESSURE: 92 MMHG | OXYGEN SATURATION: 90 % | TEMPERATURE: 98 F | SYSTOLIC BLOOD PRESSURE: 132 MMHG

## 2018-09-29 DIAGNOSIS — J44.1 COPD WITH ACUTE EXACERBATION: Primary | ICD-10-CM

## 2018-09-29 PROCEDURE — 3080F DIAST BP >= 90 MM HG: CPT | Mod: CPTII,S$GLB,, | Performed by: FAMILY MEDICINE

## 2018-09-29 PROCEDURE — 1101F PT FALLS ASSESS-DOCD LE1/YR: CPT | Mod: CPTII,S$GLB,, | Performed by: FAMILY MEDICINE

## 2018-09-29 PROCEDURE — 99214 OFFICE O/P EST MOD 30 MIN: CPT | Mod: 25,S$GLB,, | Performed by: FAMILY MEDICINE

## 2018-09-29 PROCEDURE — 3075F SYST BP GE 130 - 139MM HG: CPT | Mod: CPTII,S$GLB,, | Performed by: FAMILY MEDICINE

## 2018-09-29 PROCEDURE — 94640 AIRWAY INHALATION TREATMENT: CPT | Mod: S$GLB,,, | Performed by: FAMILY MEDICINE

## 2018-09-29 RX ORDER — PREDNISONE 20 MG/1
60 TABLET ORAL
Status: COMPLETED | OUTPATIENT
Start: 2018-09-29 | End: 2018-09-29

## 2018-09-29 RX ORDER — DOXYCYCLINE 100 MG/1
100 CAPSULE ORAL EVERY 12 HOURS
Qty: 20 CAPSULE | Refills: 0 | Status: SHIPPED | OUTPATIENT
Start: 2018-09-29 | End: 2018-10-09

## 2018-09-29 RX ORDER — ALBUTEROL SULFATE 0.83 MG/ML
2.5 SOLUTION RESPIRATORY (INHALATION)
Status: COMPLETED | OUTPATIENT
Start: 2018-09-29 | End: 2018-09-29

## 2018-09-29 RX ORDER — IPRATROPIUM BROMIDE 0.5 MG/2.5ML
0.5 SOLUTION RESPIRATORY (INHALATION)
Status: COMPLETED | OUTPATIENT
Start: 2018-09-29 | End: 2018-09-29

## 2018-09-29 RX ORDER — PREDNISONE 20 MG/1
40 TABLET ORAL DAILY
Qty: 12 TABLET | Refills: 0 | Status: SHIPPED | OUTPATIENT
Start: 2018-09-29 | End: 2018-10-05

## 2018-09-29 RX ADMIN — ALBUTEROL SULFATE 2.5 MG: 0.83 SOLUTION RESPIRATORY (INHALATION) at 10:09

## 2018-09-29 RX ADMIN — IPRATROPIUM BROMIDE 0.5 MG: 0.5 SOLUTION RESPIRATORY (INHALATION) at 10:09

## 2018-09-29 RX ADMIN — PREDNISONE 60 MG: 20 TABLET ORAL at 10:09

## 2018-09-29 NOTE — TELEPHONE ENCOUNTER
"    Reason for Disposition   [1] Fever > 100.5 F (38.1 C) AND [2] diabetes mellitus or weak immune system (e.g., HIV positive, cancer chemo, splenectomy, organ transplant, chronic steroids)    Protocols used: ST BREATHING DIFFICULTY-A-AH    Maryann called to report she is wheezing, with "a little" shortness of breath. She does have a thick, wet cough.  Reports that she was seen in [facility] on 08/25 by RIVER Carey for wheezy bronchitis, and was given abx at that time.  She is 73 years old, and currently smokes .5 ppd of cigarettes for the last 5 months.  For 56 years prior to cutting back. she smoked 1.5 ppd (84 pack years).  Maryann says that she felt better "for a little while" after she was seen in [facility], but says now she is feeling bad again.  Recommended ED now for evaluation of SOB, cough, wheezing, but she declines emphatically.  She lives alone, does not like to drive at night, and says that she will go back to [facility] tomorrow morning at 09, but will not go to the ED tonight unless she gets worse.  Of note, she says she uses her ventolin inhaler and her Advair as ordered.  She says she is not using and does not have a nebulizer for regular neb treatments and does not think she ever has.  Message to Kaushal Mauro MD, pcp.  Please contact caller directly with any additional care advice.    "

## 2018-10-02 ENCOUNTER — TELEPHONE (OUTPATIENT)
Dept: URGENT CARE | Facility: CLINIC | Age: 73
End: 2018-10-02

## 2018-10-20 DIAGNOSIS — I10 HYPERTENSION, UNSPECIFIED TYPE: ICD-10-CM

## 2018-10-22 RX ORDER — AMLODIPINE BESYLATE 5 MG/1
TABLET ORAL
Qty: 90 TABLET | Refills: 1 | Status: SHIPPED | OUTPATIENT
Start: 2018-10-22

## 2018-10-29 DIAGNOSIS — E78.5 DYSLIPIDEMIA: ICD-10-CM

## 2018-10-30 RX ORDER — PRAVASTATIN SODIUM 40 MG/1
TABLET ORAL
Qty: 90 TABLET | Refills: 3 | Status: SHIPPED | OUTPATIENT
Start: 2018-10-30

## 2018-11-11 DIAGNOSIS — F41.1 GAD (GENERALIZED ANXIETY DISORDER): ICD-10-CM

## 2018-11-12 RX ORDER — LORAZEPAM 1 MG/1
TABLET ORAL
Qty: 30 TABLET | Refills: 0 | OUTPATIENT
Start: 2018-11-12

## 2018-11-13 DIAGNOSIS — F41.1 GAD (GENERALIZED ANXIETY DISORDER): ICD-10-CM

## 2018-11-14 RX ORDER — LORAZEPAM 1 MG/1
TABLET ORAL
Qty: 30 TABLET | Refills: 0 | OUTPATIENT
Start: 2018-11-14

## 2018-11-14 RX ORDER — LORAZEPAM 1 MG/1
TABLET ORAL
Qty: 30 TABLET | Refills: 5 | Status: CANCELLED | OUTPATIENT
Start: 2018-11-14

## 2018-11-14 NOTE — TELEPHONE ENCOUNTER
Contacted patient to inform of needing an appt for further refills. Patient states she wouldn't be able to make it due to having cancer. Asked me to inform her PCP.

## 2018-11-14 NOTE — TELEPHONE ENCOUNTER
----- Message from Aleshia Morrell sent at 11/14/2018 11:10 AM CST -----  Type:  RX Refill Request    Who Called:  Patient  Refill or New Rx:  refill  RX Name and Strength:  LORazepam (ATIVAN) 1 MG tablet   How is the patient currently taking it? (ex. 1XDay):  1xday  Is this a 30 day or 90 day RX:  30 pills  Preferred Pharmacy with phone number:    [facility] [address] [place] [address] [id number] [place] [address] AT [facility] [address]  [id number] [place] [id number]  Phone: [phone] Fax: [phone]  Local or Mail Order:  Local  Ordering Provider:  same  Best Call Back Number:  [phone]  Additional Information:  Please call when completed.

## 2018-11-27 DIAGNOSIS — G47.00 INSOMNIA, UNSPECIFIED TYPE: ICD-10-CM

## 2018-11-30 RX ORDER — HYDROXYZINE HYDROCHLORIDE 25 MG/1
TABLET, FILM COATED ORAL
Qty: 60 TABLET | Refills: 11 | Status: SHIPPED | OUTPATIENT
Start: 2018-11-30

## 2018-12-12 ENCOUNTER — TELEPHONE (OUTPATIENT)
Dept: FAMILY MEDICINE | Facility: CLINIC | Age: 73
End: 2018-12-12

## 2018-12-12 NOTE — TELEPHONE ENCOUNTER
----- Message from Elina Ocampo sent at 12/11/2018  4:08 PM CST -----  Contact: [facility]  Type: Needs Medical Advice    Who Called:  Rach Mora Call Back Number:[phone]  Additional Information: patient admitted to hospice today. Question about plavixx. Would like a call back.

## 2018-12-12 NOTE — TELEPHONE ENCOUNTER
SUSANA  Phoned Rach with [facility] in regards to message. Rach states the pt was admitted with them yesterday due to finding a lung mass and pt refused treatment, was admitted with a dx of COPD. Rach was also verifying last weight measurement as she had weighed with 887 lbs upon admit with them. CLC

## 2019-01-03 DIAGNOSIS — E11.9 TYPE 2 DIABETES MELLITUS WITHOUT COMPLICATION, UNSPECIFIED WHETHER LONG TERM INSULIN USE: ICD-10-CM

## 2019-01-07 ENCOUNTER — TELEPHONE (OUTPATIENT)
Dept: FAMILY MEDICINE | Facility: CLINIC | Age: 74
End: 2019-01-07

## 2019-01-07 NOTE — TELEPHONE ENCOUNTER
----- Message from Kamille Garcia sent at 1/7/2019 11:28 AM CST -----  Contact: Patient  Type: Needs Medical Advice    Who Called:  Patient  Best Call Back Number:[phone]  Additional Information:Patient called stating that she has been put on hospice and will nort make it to her appt this month. I tried asking if she would like me to cancel her appointments and she hung up. Please call back and advise.

## 2019-07-03 ENCOUNTER — TELEPHONE (OUTPATIENT)
Dept: FAMILY MEDICINE | Facility: CLINIC | Age: 74
End: 2019-07-03

## 2019-07-05 ENCOUNTER — PES CALL (OUTPATIENT)
Dept: ADMINISTRATIVE | Facility: CLINIC | Age: 74
End: 2019-07-05

## 2019-08-07 ENCOUNTER — TELEPHONE (OUTPATIENT)
Dept: FAMILY MEDICINE | Facility: CLINIC | Age: 74
End: 2019-08-07

## 2019-08-22 ENCOUNTER — TELEPHONE (OUTPATIENT)
Dept: FAMILY MEDICINE | Facility: CLINIC | Age: 74
End: 2019-08-22

## 2019-08-22 NOTE — TELEPHONE ENCOUNTER
Attempted to contact patient to schedule a follow up hypertension appt with Aníbal. Patient did not have a voicemail set up.